# Patient Record
Sex: MALE | Race: WHITE | NOT HISPANIC OR LATINO
[De-identification: names, ages, dates, MRNs, and addresses within clinical notes are randomized per-mention and may not be internally consistent; named-entity substitution may affect disease eponyms.]

---

## 2017-12-04 PROBLEM — M25.551 RIGHT HIP PAIN: Status: ACTIVE | Noted: 2017-12-04

## 2017-12-05 ENCOUNTER — APPOINTMENT (OUTPATIENT)
Dept: ORTHOPEDIC SURGERY | Facility: CLINIC | Age: 57
End: 2017-12-05
Payer: COMMERCIAL

## 2017-12-05 VITALS
WEIGHT: 315 LBS | DIASTOLIC BLOOD PRESSURE: 90 MMHG | BODY MASS INDEX: 41.75 KG/M2 | HEART RATE: 106 BPM | SYSTOLIC BLOOD PRESSURE: 149 MMHG | HEIGHT: 73 IN

## 2017-12-05 DIAGNOSIS — Z86.39 PERSONAL HISTORY OF OTHER ENDOCRINE, NUTRITIONAL AND METABOLIC DISEASE: ICD-10-CM

## 2017-12-05 DIAGNOSIS — Z80.0 FAMILY HISTORY OF MALIGNANT NEOPLASM OF DIGESTIVE ORGANS: ICD-10-CM

## 2017-12-05 DIAGNOSIS — Z78.9 OTHER SPECIFIED HEALTH STATUS: ICD-10-CM

## 2017-12-05 DIAGNOSIS — M25.551 PAIN IN RIGHT HIP: ICD-10-CM

## 2017-12-05 DIAGNOSIS — Z82.61 FAMILY HISTORY OF ARTHRITIS: ICD-10-CM

## 2017-12-05 DIAGNOSIS — Z60.2 PROBLEMS RELATED TO LIVING ALONE: ICD-10-CM

## 2017-12-05 DIAGNOSIS — M16.11 UNILATERAL PRIMARY OSTEOARTHRITIS, RIGHT HIP: ICD-10-CM

## 2017-12-05 PROCEDURE — 73502 X-RAY EXAM HIP UNI 2-3 VIEWS: CPT | Mod: RT

## 2017-12-05 PROCEDURE — 99204 OFFICE O/P NEW MOD 45 MIN: CPT

## 2017-12-05 RX ORDER — FLUTICASONE PROPIONATE 50 UG/1
50 SPRAY, METERED NASAL
Qty: 16 | Refills: 0 | Status: ACTIVE | COMMUNITY
Start: 2017-11-24

## 2017-12-05 RX ORDER — GLIMEPIRIDE 4 MG/1
4 TABLET ORAL
Qty: 180 | Refills: 0 | Status: ACTIVE | COMMUNITY
Start: 2017-08-15

## 2017-12-05 RX ORDER — DIAZEPAM 5 MG/1
5 TABLET ORAL
Qty: 2 | Refills: 0 | Status: ACTIVE | COMMUNITY
Start: 2017-07-11

## 2017-12-05 RX ORDER — INSULIN DEGLUDEC INJECTION 200 U/ML
200 INJECTION, SOLUTION SUBCUTANEOUS
Qty: 45 | Refills: 0 | Status: ACTIVE | COMMUNITY
Start: 2017-01-09

## 2017-12-05 RX ORDER — CLARITHROMYCIN 500 MG/1
500 TABLET, FILM COATED ORAL
Qty: 20 | Refills: 0 | Status: ACTIVE | COMMUNITY
Start: 2017-11-24

## 2017-12-05 RX ORDER — RANITIDINE HYDROCHLORIDE 300 MG/1
300 CAPSULE ORAL
Qty: 90 | Refills: 0 | Status: ACTIVE | COMMUNITY
Start: 2017-01-27

## 2017-12-05 RX ORDER — VALSARTAN AND HYDROCHLOROTHIAZIDE 320; 25 MG/1; MG/1
320-25 TABLET, FILM COATED ORAL
Qty: 30 | Refills: 0 | Status: ACTIVE | COMMUNITY
Start: 2017-07-18

## 2017-12-05 RX ORDER — HYDROCHLOROTHIAZIDE 25 MG/1
25 TABLET ORAL
Qty: 30 | Refills: 0 | Status: ACTIVE | COMMUNITY
Start: 2017-06-05

## 2017-12-05 RX ORDER — ROSUVASTATIN CALCIUM 10 MG/1
10 TABLET, FILM COATED ORAL
Qty: 30 | Refills: 0 | Status: ACTIVE | COMMUNITY
Start: 2017-08-22

## 2017-12-05 RX ORDER — BLOOD SUGAR DIAGNOSTIC
STRIP MISCELLANEOUS
Qty: 200 | Refills: 0 | Status: ACTIVE | COMMUNITY
Start: 2017-01-26

## 2017-12-05 RX ORDER — TRAMADOL HYDROCHLORIDE AND ACETAMINOPHEN 37.5; 325 MG/1; MG/1
37.5-325 TABLET, FILM COATED ORAL
Qty: 60 | Refills: 0 | Status: ACTIVE | COMMUNITY
Start: 2017-08-17

## 2017-12-05 RX ORDER — MELOXICAM 15 MG/1
15 TABLET ORAL
Qty: 30 | Refills: 0 | Status: ACTIVE | COMMUNITY
Start: 2017-07-11

## 2017-12-05 RX ORDER — ROSUVASTATIN CALCIUM 5 MG/1
TABLET, FILM COATED ORAL
Refills: 0 | Status: ACTIVE | COMMUNITY

## 2017-12-05 RX ORDER — METFORMIN ER 500 MG 500 MG/1
500 TABLET ORAL
Qty: 360 | Refills: 0 | Status: ACTIVE | COMMUNITY
Start: 2017-08-15

## 2017-12-05 RX ORDER — PEN NEEDLE, DIABETIC 31 G X1/4"
31G X 6 MM NEEDLE, DISPOSABLE MISCELLANEOUS
Qty: 100 | Refills: 0 | Status: ACTIVE | COMMUNITY
Start: 2017-08-15

## 2017-12-05 RX ORDER — KETOCONAZOLE 20.5 MG/ML
2 SHAMPOO, SUSPENSION TOPICAL
Qty: 120 | Refills: 0 | Status: ACTIVE | COMMUNITY
Start: 2017-03-29

## 2017-12-05 SDOH — SOCIAL STABILITY - SOCIAL INSECURITY: PROBLEMS RELATED TO LIVING ALONE: Z60.2

## 2017-12-20 ENCOUNTER — OTHER (OUTPATIENT)
Age: 57
End: 2017-12-20

## 2017-12-26 ENCOUNTER — OTHER (OUTPATIENT)
Age: 57
End: 2017-12-26

## 2018-01-02 ENCOUNTER — APPOINTMENT (OUTPATIENT)
Dept: ORTHOPEDIC SURGERY | Facility: CLINIC | Age: 58
End: 2018-01-02

## 2018-01-16 ENCOUNTER — OUTPATIENT (OUTPATIENT)
Dept: OUTPATIENT SERVICES | Facility: HOSPITAL | Age: 58
LOS: 1 days | End: 2018-01-16
Payer: COMMERCIAL

## 2018-01-16 VITALS
HEIGHT: 73 IN | RESPIRATION RATE: 20 BRPM | WEIGHT: 315 LBS | HEART RATE: 85 BPM | DIASTOLIC BLOOD PRESSURE: 77 MMHG | SYSTOLIC BLOOD PRESSURE: 133 MMHG | TEMPERATURE: 98 F | OXYGEN SATURATION: 95 %

## 2018-01-16 DIAGNOSIS — E11.9 TYPE 2 DIABETES MELLITUS WITHOUT COMPLICATIONS: ICD-10-CM

## 2018-01-16 DIAGNOSIS — Z01.818 ENCOUNTER FOR OTHER PREPROCEDURAL EXAMINATION: ICD-10-CM

## 2018-01-16 DIAGNOSIS — Z90.89 ACQUIRED ABSENCE OF OTHER ORGANS: Chronic | ICD-10-CM

## 2018-01-16 DIAGNOSIS — M16.11 UNILATERAL PRIMARY OSTEOARTHRITIS, RIGHT HIP: ICD-10-CM

## 2018-01-16 DIAGNOSIS — Z98.890 OTHER SPECIFIED POSTPROCEDURAL STATES: Chronic | ICD-10-CM

## 2018-01-16 LAB
ALBUMIN SERPL ELPH-MCNC: 3.7 G/DL — SIGNIFICANT CHANGE UP (ref 3.3–5)
ALP SERPL-CCNC: 78 U/L — SIGNIFICANT CHANGE UP (ref 40–120)
ALT FLD-CCNC: 27 U/L — SIGNIFICANT CHANGE UP (ref 12–78)
ANION GAP SERPL CALC-SCNC: 9 MMOL/L — SIGNIFICANT CHANGE UP (ref 5–17)
APTT BLD: 30.5 SEC — SIGNIFICANT CHANGE UP (ref 27.5–37.4)
AST SERPL-CCNC: 16 U/L — SIGNIFICANT CHANGE UP (ref 15–37)
BASOPHILS # BLD AUTO: 0.1 K/UL — SIGNIFICANT CHANGE UP (ref 0–0.2)
BASOPHILS NFR BLD AUTO: 0.6 % — SIGNIFICANT CHANGE UP (ref 0–2)
BILIRUB SERPL-MCNC: 0.3 MG/DL — SIGNIFICANT CHANGE UP (ref 0.2–1.2)
BUN SERPL-MCNC: 16 MG/DL — SIGNIFICANT CHANGE UP (ref 7–23)
CALCIUM SERPL-MCNC: 9.2 MG/DL — SIGNIFICANT CHANGE UP (ref 8.5–10.1)
CHLORIDE SERPL-SCNC: 105 MMOL/L — SIGNIFICANT CHANGE UP (ref 96–108)
CO2 SERPL-SCNC: 28 MMOL/L — SIGNIFICANT CHANGE UP (ref 22–31)
CREAT SERPL-MCNC: 1.1 MG/DL — SIGNIFICANT CHANGE UP (ref 0.5–1.3)
EOSINOPHIL # BLD AUTO: 0.2 K/UL — SIGNIFICANT CHANGE UP (ref 0–0.5)
EOSINOPHIL NFR BLD AUTO: 1.9 % — SIGNIFICANT CHANGE UP (ref 0–6)
GLUCOSE SERPL-MCNC: 76 MG/DL — SIGNIFICANT CHANGE UP (ref 70–99)
HBA1C BLD-MCNC: 5.6 % — SIGNIFICANT CHANGE UP (ref 4–5.6)
HCT VFR BLD CALC: 41.3 % — SIGNIFICANT CHANGE UP (ref 39–50)
HGB BLD-MCNC: 13.4 G/DL — SIGNIFICANT CHANGE UP (ref 13–17)
INR BLD: 1 RATIO — SIGNIFICANT CHANGE UP (ref 0.88–1.16)
LYMPHOCYTES # BLD AUTO: 1.7 K/UL — SIGNIFICANT CHANGE UP (ref 1–3.3)
LYMPHOCYTES # BLD AUTO: 15.4 % — SIGNIFICANT CHANGE UP (ref 13–44)
MCHC RBC-ENTMCNC: 27.4 PG — SIGNIFICANT CHANGE UP (ref 27–34)
MCHC RBC-ENTMCNC: 32.3 GM/DL — SIGNIFICANT CHANGE UP (ref 32–36)
MCV RBC AUTO: 84.8 FL — SIGNIFICANT CHANGE UP (ref 80–100)
MONOCYTES # BLD AUTO: 0.9 K/UL — SIGNIFICANT CHANGE UP (ref 0–0.9)
MONOCYTES NFR BLD AUTO: 8.1 % — SIGNIFICANT CHANGE UP (ref 1–9)
MRSA PCR RESULT.: SIGNIFICANT CHANGE UP
NEUTROPHILS # BLD AUTO: 8.2 K/UL — HIGH (ref 1.8–7.4)
NEUTROPHILS NFR BLD AUTO: 74.1 % — SIGNIFICANT CHANGE UP (ref 43–77)
PLATELET # BLD AUTO: 360 K/UL — SIGNIFICANT CHANGE UP (ref 150–400)
POTASSIUM SERPL-MCNC: 4 MMOL/L — SIGNIFICANT CHANGE UP (ref 3.5–5.3)
POTASSIUM SERPL-SCNC: 4 MMOL/L — SIGNIFICANT CHANGE UP (ref 3.5–5.3)
PROT SERPL-MCNC: 8.1 G/DL — SIGNIFICANT CHANGE UP (ref 6–8.3)
PROTHROM AB SERPL-ACNC: 10.9 SEC — SIGNIFICANT CHANGE UP (ref 9.8–12.7)
RBC # BLD: 4.87 M/UL — SIGNIFICANT CHANGE UP (ref 4.2–5.8)
RBC # FLD: 14.3 % — SIGNIFICANT CHANGE UP (ref 10.3–14.5)
S AUREUS DNA NOSE QL NAA+PROBE: SIGNIFICANT CHANGE UP
SODIUM SERPL-SCNC: 142 MMOL/L — SIGNIFICANT CHANGE UP (ref 135–145)
WBC # BLD: 11.1 K/UL — HIGH (ref 3.8–10.5)
WBC # FLD AUTO: 11.1 K/UL — HIGH (ref 3.8–10.5)

## 2018-01-16 PROCEDURE — 93010 ELECTROCARDIOGRAM REPORT: CPT

## 2018-01-16 PROCEDURE — 71046 X-RAY EXAM CHEST 2 VIEWS: CPT | Mod: 26

## 2018-01-16 RX ORDER — DEXTROSE 50 % IN WATER 50 %
25 SYRINGE (ML) INTRAVENOUS ONCE
Qty: 0 | Refills: 0 | Status: DISCONTINUED | OUTPATIENT
Start: 2018-01-29 | End: 2018-01-30

## 2018-01-16 RX ORDER — DEXTROSE 50 % IN WATER 50 %
1 SYRINGE (ML) INTRAVENOUS ONCE
Qty: 0 | Refills: 0 | Status: DISCONTINUED | OUTPATIENT
Start: 2018-01-29 | End: 2018-01-30

## 2018-01-16 RX ORDER — DEXTROSE 50 % IN WATER 50 %
12.5 SYRINGE (ML) INTRAVENOUS ONCE
Qty: 0 | Refills: 0 | Status: DISCONTINUED | OUTPATIENT
Start: 2018-01-29 | End: 2018-01-30

## 2018-01-16 RX ORDER — GLUCAGON INJECTION, SOLUTION 0.5 MG/.1ML
1 INJECTION, SOLUTION SUBCUTANEOUS ONCE
Qty: 0 | Refills: 0 | Status: DISCONTINUED | OUTPATIENT
Start: 2018-01-29 | End: 2018-01-30

## 2018-01-16 RX ORDER — SODIUM CHLORIDE 9 MG/ML
1000 INJECTION, SOLUTION INTRAVENOUS
Qty: 0 | Refills: 0 | Status: DISCONTINUED | OUTPATIENT
Start: 2018-01-29 | End: 2018-01-30

## 2018-01-16 NOTE — H&P PST ADULT - MUSCULOSKELETAL
details… detailed exam decreased ROM due to pain/no joint warmth/no calf tenderness/normal/no joint swelling/no joint erythema/right hip

## 2018-01-16 NOTE — H&P PST ADULT - NEGATIVE ENMT SYMPTOMS
no hearing difficulty no nasal obstruction/no post-nasal discharge/no vertigo/no sinus symptoms/no tinnitus/no nasal congestion/no nasal discharge/no hearing difficulty/no ear pain

## 2018-01-16 NOTE — H&P PST ADULT - NEGATIVE GASTROINTESTINAL SYMPTOMS
no vomiting/no nausea/no diarrhea no change in bowel habits/no diarrhea/no vomiting/no constipation/no nausea/no flatulence/no abdominal pain

## 2018-01-16 NOTE — H&P PST ADULT - RS GEN PE MLT RESP DETAILS PC
no subcutaneous emphysema/breath sounds equal/airway patent/normal/no rales/no chest wall tenderness/no intercostal retractions/respirations non-labored/no rhonchi/no wheezes/good air movement/clear to auscultation bilaterally

## 2018-01-16 NOTE — H&P PST ADULT - NEGATIVE GENERAL GENITOURINARY SYMPTOMS
no flank pain R/no bladder infections/no flank pain L/no renal colic/no incontinence/no hematuria/normal libido

## 2018-01-16 NOTE — H&P PST ADULT - NEGATIVE MUSCULOSKELETAL SYMPTOMS
no joint swelling/no muscle weakness/no stiffness/no arm pain L/no arm pain R/no muscle cramps/no neck pain/no arthralgia

## 2018-01-16 NOTE — H&P PST ADULT - VISION (WITH CORRECTIVE LENSES IF THE PATIENT USUALLY WEARS THEM):
Normal vision: sees adequately in most situations; can see medication labels, newsprint Reading Glasses/Normal vision: sees adequately in most situations; can see medication labels, newsprint

## 2018-01-16 NOTE — H&P PST ADULT - ASSESSMENT
This is a 58 year old male with PMH of DM, HTN, HLD who presents today for pre-surgical testing for right total hip replacement on 01/29/17 with Dr Hopper.

## 2018-01-16 NOTE — H&P PST ADULT - HISTORY OF PRESENT ILLNESS
This is a 58 year old male with PMH of DM, HTN, HLD who presents today for pre-surgical testing for    on   . having right hip pain x 1-1.5 yrs ago, had multiple injection, had MRIs in Nov from where he was referred for hip replacement. This is a 58 year old male with PMH of DM, HTN, HLD who presents today for pre-surgical testing for right total hip replacement on 01/29/17 with Dr Hopper. Reports having right hip pain x 1-1.5 yrs ago, had multiple injection to the site with temporary relief , had MRIs in Nov and was diagnosed with osteoarthritis of right hip, from where he was referred for hip replacement. This is a 58 year old male with PMH of DM, HTN, HLD who presents today for pre-surgical testing for right total hip replacement on 01/29/17 with Dr Hopper. Reports having right hip pain x 1-1.5 yrs ago, and limited ROM, had multiple injection to the site with temporary relief , had MRIs in Nov and was diagnosed with osteoarthritis of right hip, from where he was referred for hip replacement.

## 2018-01-16 NOTE — H&P PST ADULT - PROBLEM SELECTOR PLAN 3
A1c pending. Last A1c 6.1 in november. Advised that he/she will need Endo consult if A1c came back 9 or above. Instructed patient to stop taking metformin the night before and day of surgery .Fingerstick am of surgery. Verbalized understanding.

## 2018-01-16 NOTE — H&P PST ADULT - FAMILY HISTORY
Father  Still living? No  FH: prostate cancer, Age at diagnosis: Age Unknown  Family history of colon cancer, Age at diagnosis: Age Unknown     Mother  Still living? No  Family history of angina, Age at diagnosis: Age Unknown  Family history of dementia, Age at diagnosis: Age Unknown

## 2018-01-16 NOTE — H&P PST ADULT - PROBLEM SELECTOR PLAN 2
Labs, EKG, CXR,  Medical Clearance with pmd AND cardiologist advised, Pre op and Hibiclens instructions reviewed and given. Take routine am meds on day of surgery with sips of water. Avoid NSAIDs and OTC supplements. Verbalized understanding  Celebrex as advised by DR Hopper

## 2018-01-16 NOTE — H&P PST ADULT - NEGATIVE OPHTHALMOLOGIC SYMPTOMS
no discharge L/no pain R/no photophobia/no blurred vision R/no diplopia/no blurred vision L/no lacrimation L/no lacrimation R/no discharge R/no pain L/no irritation L

## 2018-01-26 RX ORDER — VALSARTAN 80 MG/1
320 TABLET ORAL DAILY
Qty: 0 | Refills: 0 | Status: DISCONTINUED | OUTPATIENT
Start: 2018-01-29 | End: 2018-01-30

## 2018-01-26 RX ORDER — ATORVASTATIN CALCIUM 80 MG/1
40 TABLET, FILM COATED ORAL AT BEDTIME
Qty: 0 | Refills: 0 | Status: DISCONTINUED | OUTPATIENT
Start: 2018-01-29 | End: 2018-01-30

## 2018-01-26 RX ORDER — MORPHINE SULFATE 50 MG/1
4 CAPSULE, EXTENDED RELEASE ORAL
Qty: 0 | Refills: 0 | Status: DISCONTINUED | OUTPATIENT
Start: 2018-01-29 | End: 2018-01-30

## 2018-01-26 RX ORDER — INSULIN LISPRO 100/ML
VIAL (ML) SUBCUTANEOUS
Qty: 0 | Refills: 0 | Status: DISCONTINUED | OUTPATIENT
Start: 2018-01-29 | End: 2018-01-30

## 2018-01-26 RX ORDER — ACETAMINOPHEN 500 MG
650 TABLET ORAL EVERY 8 HOURS
Qty: 0 | Refills: 0 | Status: DISCONTINUED | OUTPATIENT
Start: 2018-01-30 | End: 2018-01-30

## 2018-01-26 RX ORDER — DOCUSATE SODIUM 100 MG
100 CAPSULE ORAL THREE TIMES A DAY
Qty: 0 | Refills: 0 | Status: DISCONTINUED | OUTPATIENT
Start: 2018-01-29 | End: 2018-01-30

## 2018-01-26 RX ORDER — FERROUS SULFATE 325(65) MG
325 TABLET ORAL
Qty: 0 | Refills: 0 | Status: DISCONTINUED | OUTPATIENT
Start: 2018-01-29 | End: 2018-01-30

## 2018-01-26 RX ORDER — CEFAZOLIN SODIUM 1 G
3000 VIAL (EA) INJECTION ONCE
Qty: 0 | Refills: 0 | Status: COMPLETED | OUTPATIENT
Start: 2018-01-29 | End: 2018-01-29

## 2018-01-26 RX ORDER — SODIUM CHLORIDE 9 MG/ML
1000 INJECTION, SOLUTION INTRAVENOUS
Qty: 0 | Refills: 0 | Status: DISCONTINUED | OUTPATIENT
Start: 2018-01-29 | End: 2018-01-30

## 2018-01-26 RX ORDER — HYDROMORPHONE HYDROCHLORIDE 2 MG/ML
4 INJECTION INTRAMUSCULAR; INTRAVENOUS; SUBCUTANEOUS
Qty: 0 | Refills: 0 | Status: DISCONTINUED | OUTPATIENT
Start: 2018-01-29 | End: 2018-01-30

## 2018-01-26 RX ORDER — ASPIRIN/CALCIUM CARB/MAGNESIUM 324 MG
325 TABLET ORAL
Qty: 0 | Refills: 0 | Status: DISCONTINUED | OUTPATIENT
Start: 2018-01-29 | End: 2018-01-30

## 2018-01-26 RX ORDER — HYDROMORPHONE HYDROCHLORIDE 2 MG/ML
2 INJECTION INTRAMUSCULAR; INTRAVENOUS; SUBCUTANEOUS
Qty: 0 | Refills: 0 | Status: DISCONTINUED | OUTPATIENT
Start: 2018-01-29 | End: 2018-01-30

## 2018-01-26 RX ORDER — ASCORBIC ACID 60 MG
500 TABLET,CHEWABLE ORAL
Qty: 0 | Refills: 0 | Status: DISCONTINUED | OUTPATIENT
Start: 2018-01-29 | End: 2018-01-30

## 2018-01-26 RX ORDER — SODIUM CHLORIDE 9 MG/ML
1000 INJECTION, SOLUTION INTRAVENOUS
Qty: 0 | Refills: 0 | Status: DISCONTINUED | OUTPATIENT
Start: 2018-01-29 | End: 2018-01-29

## 2018-01-26 RX ORDER — ONDANSETRON 8 MG/1
4 TABLET, FILM COATED ORAL EVERY 6 HOURS
Qty: 0 | Refills: 0 | Status: DISCONTINUED | OUTPATIENT
Start: 2018-01-29 | End: 2018-01-30

## 2018-01-26 RX ORDER — PANTOPRAZOLE SODIUM 20 MG/1
40 TABLET, DELAYED RELEASE ORAL DAILY
Qty: 0 | Refills: 0 | Status: DISCONTINUED | OUTPATIENT
Start: 2018-01-29 | End: 2018-01-30

## 2018-01-26 RX ORDER — CELECOXIB 200 MG/1
200 CAPSULE ORAL
Qty: 0 | Refills: 0 | Status: DISCONTINUED | OUTPATIENT
Start: 2018-01-29 | End: 2018-01-30

## 2018-01-26 RX ORDER — FOLIC ACID 0.8 MG
1 TABLET ORAL DAILY
Qty: 0 | Refills: 0 | Status: DISCONTINUED | OUTPATIENT
Start: 2018-01-29 | End: 2018-01-30

## 2018-01-27 PROCEDURE — 83036 HEMOGLOBIN GLYCOSYLATED A1C: CPT

## 2018-01-27 PROCEDURE — 87641 MR-STAPH DNA AMP PROBE: CPT

## 2018-01-27 PROCEDURE — 93005 ELECTROCARDIOGRAM TRACING: CPT

## 2018-01-27 PROCEDURE — 86900 BLOOD TYPING SEROLOGIC ABO: CPT

## 2018-01-27 PROCEDURE — 86920 COMPATIBILITY TEST SPIN: CPT

## 2018-01-27 PROCEDURE — 86850 RBC ANTIBODY SCREEN: CPT

## 2018-01-27 PROCEDURE — 80053 COMPREHEN METABOLIC PANEL: CPT

## 2018-01-27 PROCEDURE — 85610 PROTHROMBIN TIME: CPT

## 2018-01-27 PROCEDURE — 85730 THROMBOPLASTIN TIME PARTIAL: CPT

## 2018-01-27 PROCEDURE — G0463: CPT

## 2018-01-27 PROCEDURE — 87640 STAPH A DNA AMP PROBE: CPT

## 2018-01-27 PROCEDURE — 71046 X-RAY EXAM CHEST 2 VIEWS: CPT

## 2018-01-27 PROCEDURE — 85027 COMPLETE CBC AUTOMATED: CPT

## 2018-01-27 PROCEDURE — 86901 BLOOD TYPING SEROLOGIC RH(D): CPT

## 2018-01-29 ENCOUNTER — TRANSCRIPTION ENCOUNTER (OUTPATIENT)
Age: 58
End: 2018-01-29

## 2018-01-29 ENCOUNTER — INPATIENT (INPATIENT)
Facility: HOSPITAL | Age: 58
LOS: 0 days | Discharge: HOME | DRG: 470 | End: 2018-01-30
Attending: ORTHOPAEDIC SURGERY | Admitting: ORTHOPAEDIC SURGERY
Payer: COMMERCIAL

## 2018-01-29 ENCOUNTER — RESULT REVIEW (OUTPATIENT)
Age: 58
End: 2018-01-29

## 2018-01-29 VITALS
TEMPERATURE: 98 F | OXYGEN SATURATION: 98 % | HEART RATE: 91 BPM | RESPIRATION RATE: 16 BRPM | WEIGHT: 315 LBS | DIASTOLIC BLOOD PRESSURE: 68 MMHG | HEIGHT: 73 IN | SYSTOLIC BLOOD PRESSURE: 121 MMHG

## 2018-01-29 DIAGNOSIS — I10 ESSENTIAL (PRIMARY) HYPERTENSION: ICD-10-CM

## 2018-01-29 DIAGNOSIS — G47.39 OTHER SLEEP APNEA: ICD-10-CM

## 2018-01-29 DIAGNOSIS — Z98.890 OTHER SPECIFIED POSTPROCEDURAL STATES: Chronic | ICD-10-CM

## 2018-01-29 DIAGNOSIS — E78.5 HYPERLIPIDEMIA, UNSPECIFIED: ICD-10-CM

## 2018-01-29 DIAGNOSIS — M16.11 UNILATERAL PRIMARY OSTEOARTHRITIS, RIGHT HIP: ICD-10-CM

## 2018-01-29 DIAGNOSIS — Z90.89 ACQUIRED ABSENCE OF OTHER ORGANS: Chronic | ICD-10-CM

## 2018-01-29 LAB
HCT VFR BLD CALC: 34.5 % — LOW (ref 39–50)
HGB BLD-MCNC: 11 G/DL — LOW (ref 13–17)

## 2018-01-29 PROCEDURE — 88305 TISSUE EXAM BY PATHOLOGIST: CPT | Mod: 26

## 2018-01-29 PROCEDURE — 88311 DECALCIFY TISSUE: CPT | Mod: 26

## 2018-01-29 PROCEDURE — 73501 X-RAY EXAM HIP UNI 1 VIEW: CPT | Mod: 26,RT

## 2018-01-29 RX ORDER — SODIUM CHLORIDE 9 MG/ML
1000 INJECTION, SOLUTION INTRAVENOUS
Qty: 0 | Refills: 0 | Status: DISCONTINUED | OUTPATIENT
Start: 2018-01-29 | End: 2018-01-29

## 2018-01-29 RX ORDER — ACETAMINOPHEN 500 MG
1000 TABLET ORAL ONCE
Qty: 0 | Refills: 0 | Status: COMPLETED | OUTPATIENT
Start: 2018-01-29 | End: 2018-01-29

## 2018-01-29 RX ORDER — HYDROMORPHONE HYDROCHLORIDE 2 MG/ML
0.5 INJECTION INTRAMUSCULAR; INTRAVENOUS; SUBCUTANEOUS
Qty: 0 | Refills: 0 | Status: DISCONTINUED | OUTPATIENT
Start: 2018-01-29 | End: 2018-01-29

## 2018-01-29 RX ORDER — ONDANSETRON 8 MG/1
4 TABLET, FILM COATED ORAL ONCE
Qty: 0 | Refills: 0 | Status: DISCONTINUED | OUTPATIENT
Start: 2018-01-29 | End: 2018-01-29

## 2018-01-29 RX ORDER — CEFAZOLIN SODIUM 1 G
3000 VIAL (EA) INJECTION EVERY 8 HOURS
Qty: 0 | Refills: 0 | Status: COMPLETED | OUTPATIENT
Start: 2018-01-29 | End: 2018-01-30

## 2018-01-29 RX ORDER — METOCLOPRAMIDE HCL 10 MG
10 TABLET ORAL ONCE
Qty: 0 | Refills: 0 | Status: DISCONTINUED | OUTPATIENT
Start: 2018-01-29 | End: 2018-01-29

## 2018-01-29 RX ORDER — ACETAMINOPHEN 500 MG
1000 TABLET ORAL ONCE
Qty: 0 | Refills: 0 | Status: COMPLETED | OUTPATIENT
Start: 2018-01-30 | End: 2018-01-30

## 2018-01-29 RX ORDER — BUPIVACAINE 13.3 MG/ML
20 INJECTION, SUSPENSION, LIPOSOMAL INFILTRATION ONCE
Qty: 0 | Refills: 0 | Status: DISCONTINUED | OUTPATIENT
Start: 2018-01-29 | End: 2018-01-29

## 2018-01-29 RX ORDER — MEPERIDINE HYDROCHLORIDE 50 MG/ML
12.5 INJECTION INTRAMUSCULAR; INTRAVENOUS; SUBCUTANEOUS
Qty: 0 | Refills: 0 | Status: DISCONTINUED | OUTPATIENT
Start: 2018-01-29 | End: 2018-01-29

## 2018-01-29 RX ORDER — OXYCODONE AND ACETAMINOPHEN 5; 325 MG/1; MG/1
1 TABLET ORAL EVERY 4 HOURS
Qty: 0 | Refills: 0 | Status: DISCONTINUED | OUTPATIENT
Start: 2018-01-29 | End: 2018-01-29

## 2018-01-29 RX ADMIN — Medication 500 MILLIGRAM(S): at 17:45

## 2018-01-29 RX ADMIN — SODIUM CHLORIDE 100 MILLILITER(S): 9 INJECTION, SOLUTION INTRAVENOUS at 16:21

## 2018-01-29 RX ADMIN — Medication 100 MILLIGRAM(S): at 22:25

## 2018-01-29 RX ADMIN — Medication 400 MILLIGRAM(S): at 12:57

## 2018-01-29 RX ADMIN — Medication 1: at 16:49

## 2018-01-29 RX ADMIN — Medication 1000 MILLIGRAM(S): at 19:32

## 2018-01-29 RX ADMIN — Medication 325 MILLIGRAM(S): at 17:45

## 2018-01-29 RX ADMIN — Medication 1000 MILLIGRAM(S): at 13:13

## 2018-01-29 RX ADMIN — Medication 400 MILLIGRAM(S): at 18:09

## 2018-01-29 RX ADMIN — CELECOXIB 200 MILLIGRAM(S): 200 CAPSULE ORAL at 19:32

## 2018-01-29 RX ADMIN — ATORVASTATIN CALCIUM 40 MILLIGRAM(S): 80 TABLET, FILM COATED ORAL at 22:25

## 2018-01-29 RX ADMIN — SODIUM CHLORIDE 75 MILLILITER(S): 9 INJECTION, SOLUTION INTRAVENOUS at 07:05

## 2018-01-29 RX ADMIN — SODIUM CHLORIDE 100 MILLILITER(S): 9 INJECTION, SOLUTION INTRAVENOUS at 11:15

## 2018-01-29 RX ADMIN — Medication 200 MILLIGRAM(S): at 16:21

## 2018-01-29 RX ADMIN — CELECOXIB 200 MILLIGRAM(S): 200 CAPSULE ORAL at 17:45

## 2018-01-29 NOTE — PROGRESS NOTE ADULT - SUBJECTIVE AND OBJECTIVE BOX
Orthopedics Post-op Check    POD 0 Total Hip Arthroplasty  Pt Seen and Examined. Pain is controlled with only some discomfort. No nausea or vomiting.    Vital Signs Last 24 Hrs  T(C): 36.3 (01-29-18 @ 11:02), Max: 36.5 (01-29-18 @ 06:33)  T(F): 97.3 (01-29-18 @ 11:02), Max: 97.7 (01-29-18 @ 06:33)  HR: 102 (01-29-18 @ 11:32) (91 - 112)  BP: 16/60 (01-29-18 @ 11:32) (16/60 - 148/58)  BP(mean): --  RR: 16 (01-29-18 @ 11:32) (16 - 18)  SpO2: 98% (01-29-18 @ 11:32) (90% - 98%)                        11.0   x     )-----------( x        ( 29 Jan 2018 11:09 )             34.5       Exam:  NAD AAOx3  Dressing clean and dry  +EHL FHL TA GS  SILT L2-S1  +DP  Calf Soft NT    A/P:   58y Male s/p R Total Hip Arthroplasty  -Pain control  -DVT/PE ppx  - WBAT/PT/OOB  - FU Labs  - D/C Planning

## 2018-01-29 NOTE — ASU PATIENT PROFILE, ADULT - PMH
Hyperlipidemia    Hypertension    Low back pain, unspecified back pain laterality, unspecified chronicity, with sciatica presence unspecified    Other sleep apnea    Type 2 diabetes mellitus    Unilateral primary osteoarthritis, right hip

## 2018-01-29 NOTE — PHYSICAL THERAPY INITIAL EVALUATION ADULT - CRITERIA FOR SKILLED THERAPEUTIC INTERVENTIONS
therapy frequency/anticipated discharge recommendation/impairments found/rehab potential/functional limitations in following categories/risk reduction/prevention

## 2018-01-29 NOTE — CONSULT NOTE ADULT - PROBLEM SELECTOR RECOMMENDATION 9
pt's family will bring his Tresiba tomorrow (would consider lowering dose when initiated)  hold metformin and glimeperide

## 2018-01-29 NOTE — CONSULT NOTE ADULT - SUBJECTIVE AND OBJECTIVE BOX
Patient is a 58y old  Male who presents with a chief complaint of " Have bad arthritis of right hip, bone on bone" (29 Jan 2018 11:13)  pt feels very well presently, no complaints      INTERVAL HPI/OVERNIGHT EVENTS:  T(C): 36.5 (01-29-18 @ 16:03), Max: 36.6 (01-29-18 @ 13:45)  HR: 94 (01-29-18 @ 16:03) (91 - 112)  BP: 109/68 (01-29-18 @ 16:03) (103/51 - 148/58)  RR: 16 (01-29-18 @ 16:03) (15 - 18)  SpO2: 98% (01-29-18 @ 18:26) (80% - 98%)  Wt(kg): --  I&O's Summary    29 Jan 2018 07:01  -  29 Jan 2018 20:42  --------------------------------------------------------  IN: 2230 mL / OUT: 700 mL / NET: 1530 mL        PAST MEDICAL & SURGICAL HISTORY:  Low back pain, unspecified back pain laterality, unspecified chronicity, with sciatica presence unspecified  Unilateral primary osteoarthritis, right hip  Hyperlipidemia  Hypertension  Other sleep apnea  Type 2 diabetes mellitus  H/O cystoscopy  History of tonsillectomy      SOCIAL HISTORY  Alcohol: social  Tobacco:quit '07  Illicit substance use: none      FAMILY HISTORY:    Home Medications:  CeleBREX 100 mg oral capsule: 1 cap(s) orally 2 times a day (29 Jan 2018 06:23)  Co Q-10 100 mg oral capsule: 1 cap(s) orally once a day (29 Jan 2018 06:23)  glimepiride 4 mg oral tablet: 1 tab(s) orally 2 times a day (29 Jan 2018 06:23)  metFORMIN 500 mg oral tablet:  orally 2 in am and 2 at night (29 Jan 2018 06:23)  rosuvastatin 10 mg oral tablet: 1 tab(s) orally once a day (at bedtime) (29 Jan 2018 06:23)  Tresiba FlexTouch 100 units/mL subcutaneous solution: 30 units in am  30 units in pm (29 Jan 2018 06:23)  valsartan-hydrochlorothiazide 320mg-25mg oral tablet: 1 tab(s) orally once a day (29 Jan 2018 06:23)        LABS:                        11.0   x     )-----------( x        ( 29 Jan 2018 11:09 )             34.5               CAPILLARY BLOOD GLUCOSE      POCT Blood Glucose.: 163 mg/dL (29 Jan 2018 16:34)  POCT Blood Glucose.: 120 mg/dL (29 Jan 2018 11:05)  POCT Blood Glucose.: 100 mg/dL (29 Jan 2018 06:25)            MEDICATIONS  (STANDING):  ascorbic acid 500 milliGRAM(s) Oral two times a day  aspirin enteric coated 325 milliGRAM(s) Oral two times a day  atorvastatin 40 milliGRAM(s) Oral at bedtime  ceFAZolin   IVPB 3000 milliGRAM(s) IV Intermittent every 8 hours  celecoxib 200 milliGRAM(s) Oral two times a day after meals  dextrose 5%. 1000 milliLiter(s) (50 mL/Hr) IV Continuous <Continuous>  dextrose 50% Injectable 12.5 Gram(s) IV Push once  dextrose 50% Injectable 25 Gram(s) IV Push once  dextrose 50% Injectable 25 Gram(s) IV Push once  docusate sodium 100 milliGRAM(s) Oral three times a day  ferrous    sulfate 325 milliGRAM(s) Oral three times a day with meals  folic acid 1 milliGRAM(s) Oral daily  insulin lispro (HumaLOG) corrective regimen sliding scale   SubCutaneous three times a day before meals  lactated ringers. 1000 milliLiter(s) (100 mL/Hr) IV Continuous <Continuous>  multivitamin 1 Tablet(s) Oral daily  pantoprazole    Tablet 40 milliGRAM(s) Oral daily  valsartan 320 milliGRAM(s) Oral daily    MEDICATIONS  (PRN):  dextrose Gel 1 Dose(s) Oral once PRN Blood Glucose LESS THAN 70 milliGRAM(s)/deciLiter  glucagon  Injectable 1 milliGRAM(s) IntraMuscular once PRN Glucose <70 milliGRAM(s)/deciLiter  HYDROmorphone   Tablet 2 milliGRAM(s) Oral every 3 hours PRN Moderate Pain (4 - 6)  HYDROmorphone   Tablet 4 milliGRAM(s) Oral every 3 hours PRN Severe Pain (7 - 10)  morphine  - Injectable 4 milliGRAM(s) IV Push every 3 hours PRN Severe Pain (7 - 10)  ondansetron Injectable 4 milliGRAM(s) IV Push every 6 hours PRN Nausea and/or Vomiting      REVIEW OF SYSTEMS:  CONSTITUTIONAL: No fever, weight loss, or fatigue  EYES: No eye pain, visual disturbances, or discharge  ENMT:  No difficulty hearing, tinnitus, vertigo; No sinus or throat pain  NECK: No pain or stiffness  RESPIRATORY: No cough, wheezing, chills or hemoptysis; No shortness of breath  CARDIOVASCULAR: No chest pain, palpitations, dizziness, or leg swelling  GASTROINTESTINAL: No abdominal or epigastric pain. No nausea, vomiting, or hematemesis; No diarrhea or constipation. No melena or hematochezia.  GENITOURINARY: No dysuria, frequency, hematuria, or incontinence  NEUROLOGICAL: No headaches, memory loss, loss of strength, numbness, or tremors  SKIN: No itching, burning, rashes, or lesions   LYMPH NODES: No enlarged glands  ENDOCRINE: No heat or cold intolerance; No hair loss  MUSCULOSKELETAL: chronic hip pain  PSYCHIATRIC: No depression, anxiety, mood swings, or difficulty sleeping  HEME/LYMPH: No easy bruising, or bleeding gums  ALLERY AND IMMUNOLOGIC: No hives or eczema    RADIOLOGY & ADDITIONAL TESTS:    Imaging Personally Reviewed:  [y ] YES  [ ] NO    Consultant(s) Notes Reviewed:  [y ] YES  [ ] NO        PHYSICAL EXAM:  GENERAL: NAD, well-groomed, well-developed  HEAD:  Atraumatic, Normocephalic  EYES: EOMI, PERRLA, conjunctiva and sclera clear  ENMT: No tonsillar erythema, exudates, or enlargement; Moist mucous membranes, Good dentition, No lesions  NECK: Supple, No JVD, Normal thyroid  NERVOUS SYSTEM:  Alert & Oriented X3, Good concentration; Motor Strength 5/5 B/L upper and lower extremities; DTRs 2+ intact and symmetric  CHEST/LUNG: Clear to percussion bilaterally; No rales, rhonchi, wheezing, or rubs  HEART: Regular rate and rhythm; No murmurs, rubs, or gallops  ABDOMEN: Soft, Nontender, Nondistended; Bowel sounds present  EXTREMITIES:  2+ Peripheral Pulses, No clubbing, cyanosis, or edema  LYMPH: No lymphadenopathy noted  SKIN: No rashes or lesions    Care Discussed with Consultants/Other Providers [y ] YES  [ ] NO

## 2018-01-29 NOTE — BRIEF OPERATIVE NOTE - PROCEDURE
<<-----Click on this checkbox to enter Procedure EDUARDO (total hip arthroplasty)  01/30/2018    Active  [id number]

## 2018-01-30 VITALS
RESPIRATION RATE: 16 BRPM | OXYGEN SATURATION: 93 % | HEART RATE: 94 BPM | SYSTOLIC BLOOD PRESSURE: 115 MMHG | TEMPERATURE: 98 F | DIASTOLIC BLOOD PRESSURE: 70 MMHG

## 2018-01-30 LAB
ANION GAP SERPL CALC-SCNC: 7 MMOL/L — SIGNIFICANT CHANGE UP (ref 5–17)
BUN SERPL-MCNC: 13 MG/DL — SIGNIFICANT CHANGE UP (ref 7–23)
CALCIUM SERPL-MCNC: 8.6 MG/DL — SIGNIFICANT CHANGE UP (ref 8.5–10.1)
CHLORIDE SERPL-SCNC: 102 MMOL/L — SIGNIFICANT CHANGE UP (ref 96–108)
CO2 SERPL-SCNC: 30 MMOL/L — SIGNIFICANT CHANGE UP (ref 22–31)
CREAT SERPL-MCNC: 1 MG/DL — SIGNIFICANT CHANGE UP (ref 0.5–1.3)
GLUCOSE SERPL-MCNC: 128 MG/DL — HIGH (ref 70–99)
HCT VFR BLD CALC: 31.9 % — LOW (ref 39–50)
HGB BLD-MCNC: 10.2 G/DL — LOW (ref 13–17)
POTASSIUM SERPL-MCNC: 4 MMOL/L — SIGNIFICANT CHANGE UP (ref 3.5–5.3)
POTASSIUM SERPL-SCNC: 4 MMOL/L — SIGNIFICANT CHANGE UP (ref 3.5–5.3)
SODIUM SERPL-SCNC: 139 MMOL/L — SIGNIFICANT CHANGE UP (ref 135–145)

## 2018-01-30 PROCEDURE — C1776: CPT

## 2018-01-30 PROCEDURE — 88311 DECALCIFY TISSUE: CPT

## 2018-01-30 PROCEDURE — 97530 THERAPEUTIC ACTIVITIES: CPT

## 2018-01-30 PROCEDURE — 97116 GAIT TRAINING THERAPY: CPT

## 2018-01-30 PROCEDURE — C1713: CPT

## 2018-01-30 PROCEDURE — 97161 PT EVAL LOW COMPLEX 20 MIN: CPT

## 2018-01-30 PROCEDURE — 82962 GLUCOSE BLOOD TEST: CPT

## 2018-01-30 PROCEDURE — 80048 BASIC METABOLIC PNL TOTAL CA: CPT

## 2018-01-30 PROCEDURE — 73501 X-RAY EXAM HIP UNI 1 VIEW: CPT

## 2018-01-30 PROCEDURE — 88305 TISSUE EXAM BY PATHOLOGIST: CPT

## 2018-01-30 PROCEDURE — 85018 HEMOGLOBIN: CPT

## 2018-01-30 RX ORDER — ROSUVASTATIN CALCIUM 5 MG/1
1 TABLET ORAL
Qty: 0 | Refills: 0 | COMMUNITY

## 2018-01-30 RX ORDER — METFORMIN HYDROCHLORIDE 850 MG/1
1 TABLET ORAL
Qty: 0 | Refills: 0 | DISCHARGE
Start: 2018-01-30

## 2018-01-30 RX ORDER — INSULIN DEGLUDEC 100 U/ML
0 INJECTION, SOLUTION SUBCUTANEOUS
Qty: 0 | Refills: 0 | DISCHARGE
Start: 2018-01-30

## 2018-01-30 RX ORDER — PANTOPRAZOLE SODIUM 20 MG/1
1 TABLET, DELAYED RELEASE ORAL
Qty: 30 | Refills: 0 | OUTPATIENT
Start: 2018-01-30 | End: 2018-02-28

## 2018-01-30 RX ORDER — PANTOPRAZOLE SODIUM 20 MG/1
1 TABLET, DELAYED RELEASE ORAL
Qty: 30 | Refills: 0
Start: 2018-01-30 | End: 2018-02-28

## 2018-01-30 RX ORDER — METFORMIN HYDROCHLORIDE 850 MG/1
2 TABLET ORAL
Qty: 0 | Refills: 0 | DISCHARGE
Start: 2018-01-30

## 2018-01-30 RX ORDER — ROSUVASTATIN CALCIUM 5 MG/1
1 TABLET ORAL
Qty: 0 | Refills: 0 | DISCHARGE
Start: 2018-01-30

## 2018-01-30 RX ORDER — ASPIRIN/CALCIUM CARB/MAGNESIUM 324 MG
1 TABLET ORAL
Qty: 60 | Refills: 0
Start: 2018-01-30 | End: 2018-02-28

## 2018-01-30 RX ORDER — HYDROMORPHONE HYDROCHLORIDE 2 MG/ML
1 INJECTION INTRAMUSCULAR; INTRAVENOUS; SUBCUTANEOUS
Qty: 28 | Refills: 0
Start: 2018-01-30 | End: 2018-02-05

## 2018-01-30 RX ORDER — GLIMEPIRIDE 1 MG
1 TABLET ORAL
Qty: 0 | Refills: 0 | COMMUNITY

## 2018-01-30 RX ORDER — ASPIRIN/CALCIUM CARB/MAGNESIUM 324 MG
1 TABLET ORAL
Qty: 60 | Refills: 0 | OUTPATIENT
Start: 2018-01-30 | End: 2018-02-28

## 2018-01-30 RX ORDER — HYDROMORPHONE HYDROCHLORIDE 2 MG/ML
1 INJECTION INTRAMUSCULAR; INTRAVENOUS; SUBCUTANEOUS
Qty: 28 | Refills: 0 | OUTPATIENT
Start: 2018-01-30 | End: 2018-02-05

## 2018-01-30 RX ORDER — GLIMEPIRIDE 1 MG
1 TABLET ORAL
Qty: 0 | Refills: 0 | DISCHARGE
Start: 2018-01-30

## 2018-01-30 RX ORDER — INSULIN DEGLUDEC 100 U/ML
0 INJECTION, SOLUTION SUBCUTANEOUS
Qty: 0 | Refills: 0 | COMMUNITY

## 2018-01-30 RX ADMIN — CELECOXIB 200 MILLIGRAM(S): 200 CAPSULE ORAL at 08:47

## 2018-01-30 RX ADMIN — Medication 1000 MILLIGRAM(S): at 03:40

## 2018-01-30 RX ADMIN — Medication 325 MILLIGRAM(S): at 05:50

## 2018-01-30 RX ADMIN — CELECOXIB 200 MILLIGRAM(S): 200 CAPSULE ORAL at 11:16

## 2018-01-30 RX ADMIN — Medication 400 MILLIGRAM(S): at 02:47

## 2018-01-30 RX ADMIN — Medication 200 MILLIGRAM(S): at 00:31

## 2018-01-30 RX ADMIN — Medication 500 MILLIGRAM(S): at 05:50

## 2018-01-30 RX ADMIN — Medication 325 MILLIGRAM(S): at 08:47

## 2018-01-30 RX ADMIN — Medication 100 MILLIGRAM(S): at 05:50

## 2018-01-30 NOTE — DISCHARGE NOTE ADULT - CARE PLAN
Principal Discharge DX:	Unilateral primary osteoarthritis, right hip  Goal:	RECOVER FROM SURGERY, PHYSICAL THERAPY  Assessment and plan of treatment:	WEIGHT BEARING AS TOLERATED.  FOLLOW UP WITH DR. SUTTON NEXT THURSDAY 02/08/2018 CALL [phone] FOR AN APPOINTMENT.  KEEP HIP PREVENA WOUND VAC IN PLACE , KEEP THE AREA DRY AND CLEAN, IT WILL BE CHANGED OR REMOVED ON YOUR NEXT OFFICE VISIT. Principal Discharge DX:	Unilateral primary osteoarthritis, right hip  Goal:	RECOVER FROM SURGERY, PHYSICAL THERAPY  Assessment and plan of treatment:	WEIGHT BEARING AS TOLERATED.  FOLLOW UP WITH DR. SUTTON NEXT THURSDAY 02/08/2018 CALL [phone] FOR AN APPOINTMENT.  KEEP RIGHT HIP PREVENA WOUND VAC IN PLACE , KEEP THE AREA DRY AND CLEAN, IT WILL BE CHANGED OR REMOVED ON YOUR NEXT OFFICE VISIT.

## 2018-01-30 NOTE — DISCHARGE NOTE ADULT - HOSPITAL COURSE
THIS IS A CASE OF A 57 YO MALE EVALUATED IN THE OFFICE DUE TO RIGHT  HIP PAIN.    PAST MEDICAL HISTORY: HTN, HYPERLIPIDEMIA, OA, EMILEE, TYPE 2 DIABETES     HOSPITAL COURSE: AFTER THE RISK AND BENEFITS OF SURGICAL INTERVENTION IN DETAILS WERE DISCUSSED WITH THE PATIENT, A CONSENT WAS OBTAINED. AFTER OBTAINING MEDICAL CLEARANCE AND PREOPERATIVE EVALUATION, THE PATIENT WAS TAKEN TO THE OPERATING ROOM ON 01/29/2018 AND THE PATIENT UNDERWENT A  RIGHT TOTAL HIP REPLACEMENT. POSTOPERATIVE PHASE, THE PATIENT WAS ANTICOAGULATED WITH ASPIRIN  AND WAS GIVEN 24 HOURS OF IV ANTIBIOTICS. A SOCIAL SERVICE CONSULT WAS OBTAINED FOR DISCHARGE PLANNING.  A PHYSICAL THERAPY CONSULT WAS OBTAINED FOR WEIGHT BEARING AS TOLERATED, HIP PRECAUTIONS.  DUE TO ANEMIA OF ACUTE BLOOD LOSS POST OP IRON SUPPLEMENT GIVEN.    DISPOSITION : HOME WITH HOME CARE ,  FOLLOW UP WITH DR. SUTTON AS OUTPATIENT. THIS IS A CASE OF A 57 YO MALE EVALUATED IN THE OFFICE DUE TO RIGHT  HIP PAIN.    PAST MEDICAL HISTORY: HTN, HYPERLIPIDEMIA, OA, EMILEE, TYPE 2 DIABETES     HOSPITAL COURSE: AFTER THE RISK AND BENEFITS OF SURGICAL INTERVENTION IN DETAILS WERE DISCUSSED WITH THE PATIENT, A CONSENT WAS OBTAINED. AFTER OBTAINING MEDICAL CLEARANCE AND PREOPERATIVE EVALUATION, THE PATIENT WAS TAKEN TO THE OPERATING ROOM ON 01/29/2018 AND THE PATIENT UNDERWENT A  RIGHT TOTAL HIP REPLACEMENT. POSTOPERATIVE PHASE, THE PATIENT WAS ANTICOAGULATED WITH ASPIRIN  AND WAS GIVEN 24 HOURS OF IV ANTIBIOTICS. A SOCIAL SERVICE CONSULT WAS OBTAINED FOR DISCHARGE PLANNING.  A PHYSICAL THERAPY CONSULT WAS OBTAINED FOR WEIGHT BEARING AS TOLERATED, HIP PRECAUTIONS.  DUE TO ANEMIA OF ACUTE BLOOD LOSS POST OP IRON SUPPLEMENT GIVEN.    DISPOSITION : HOME WITH HOME CARE WITH PREVENA WOUND VAC IN PLACE,  FOLLOW UP WITH DR. SUTTON AS OUTPATIENT.

## 2018-01-30 NOTE — DISCHARGE NOTE ADULT - NSTOBACCOHOTLINE_GEN_A_CS
[facility] Smokers Quitline ([phone]) [facility] Smokers Quitline ([phone])

## 2018-01-30 NOTE — DISCHARGE NOTE ADULT - PATIENT PORTAL LINK FT
“You can access the [employer] Patient Portal, offered by [facility], by registering with the following website: [email or web address]”

## 2018-01-30 NOTE — DISCHARGE NOTE ADULT - PLAN OF CARE
RECOVER FROM SURGERY, PHYSICAL THERAPY WEIGHT BEARING AS TOLERATED.  FOLLOW UP WITH DR. SUTTON NEXT THURSDAY 02/08/2018 CALL [phone] FOR AN APPOINTMENT.  KEEP HIP PREVENA WOUND VAC IN PLACE , KEEP THE AREA DRY AND CLEAN, IT WILL BE CHANGED OR REMOVED ON YOUR NEXT OFFICE VISIT. WEIGHT BEARING AS TOLERATED.  FOLLOW UP WITH DR. SUTTON NEXT THURSDAY 02/08/2018 CALL [phone] FOR AN APPOINTMENT.  KEEP RIGHT HIP PREVENA WOUND VAC IN PLACE , KEEP THE AREA DRY AND CLEAN, IT WILL BE CHANGED OR REMOVED ON YOUR NEXT OFFICE VISIT.

## 2018-01-30 NOTE — DISCHARGE NOTE ADULT - MEDICATION SUMMARY - MEDICATIONS TO TAKE
I will START or STAY ON the medications listed below when I get home from the hospital:    HYDROmorphone 2 mg oral tablet  -- 1 tab(s) by mouth every 6 hours MDD:4  -- Indication: For PAIN    Aspirin Enteric Coated 325 mg oral delayed release tablet  -- 1 tab(s) by mouth 2 times a day   -- Indication: For DVT PROPHYLAXIS FOR 30 DAYS     CeleBREX 100 mg oral capsule  -- 1 cap(s) by mouth 2 times a day  -- Indication: For PAIN    metFORMIN 500 mg oral tablet  -- 1 tab(s) by mouth 2 times a day  -- Indication: For HOME MEDICATION     glimepiride 4 mg oral tablet  -- 1 tab(s) by mouth 2 times a day  -- Indication: For HOME MEDICATION     insulin degludec 100 units/mL subcutaneous solution  -- TRESIBA FLEX TOUCH 110 UNITS/ ML SUBCUTANEOUC SOLUTUINS - 30 CC BY MOUTH  FOR CONSTIPATION UNITS IN AM   -- Indication: For HOME MEDICATION     rosuvastatin 10 mg oral tablet  -- 1 tab(s) by mouth once a day (at bedtime)  -- Indication: For HOME MEDICATION     valsartan-hydrochlorothiazide 320mg-25mg oral tablet  -- 1 tab(s) by mouth once a day  -- Indication: For HOME MEDICATION     pantoprazole 40 mg oral delayed release tablet  -- 1 tab(s) by mouth once a day  -- Indication: For PrEVENT STRESS ULCER I will START or STAY ON the medications listed below when I get home from the hospital:    Aspirin Enteric Coated 325 mg oral delayed release tablet  -- 1 tab(s) by mouth 2 times a day for dvt prophylaxis  -- Indication: For DVT PROPHYLAXIS    HYDROmorphone 2 mg oral tablet  -- 1 tab(s) by mouth every 6 hours MDD:4   -- Indication: For PAIN    CeleBREX 100 mg oral capsule  -- 1 cap(s) by mouth 2 times a day  -- Indication: For PAIN    metFORMIN 500 mg oral tablet  -- 1 tab(s) by mouth 2 times a day  -- Indication: For HOME MEDICATION     glimepiride 4 mg oral tablet  -- 1 tab(s) by mouth 2 times a day  -- Indication: For HOME MEDICATION     insulin degludec 100 units/mL subcutaneous solution  -- TRESIBA FLEX TOUCH 110 UNITS/ ML SUBCUTANEOUC SOLUTUINS - 30 CC BY MOUTH  FOR CONSTIPATION UNITS IN AM   -- Indication: For HOME MEDICATION     rosuvastatin 10 mg oral tablet  -- 1 tab(s) by mouth once a day (at bedtime)  -- Indication: For HOME MEDICATION     valsartan-hydrochlorothiazide 320mg-25mg oral tablet  -- 1 tab(s) by mouth once a day  -- Indication: For HOME MEDICATION     pantoprazole 40 mg oral delayed release tablet  -- 1 tab(s) by mouth once a day   -- Indication: For GI PROPHYLAXIS

## 2018-01-30 NOTE — DISCHARGE NOTE ADULT - VISION (WITH CORRECTIVE LENSES IF THE PATIENT USUALLY WEARS THEM):
Normal vision: sees adequately in most situations; can see medication labels, newsprint/Reading Glasses

## 2018-01-30 NOTE — PROGRESS NOTE ADULT - SUBJECTIVE AND OBJECTIVE BOX
The patient was interviewed and evaluated  58y Male s/p right THR with GA    T(C): 36.8 (01-30-18 @ 07:37), Max: 37.2 (01-30-18 @ 04:20)  HR: 94 (01-30-18 @ 07:37) (90 - 112)  BP: 115/70 (01-30-18 @ 07:37) (95/56 - 148/58)  RR: 16 (01-30-18 @ 07:37) (15 - 18)  SpO2: 93% (01-30-18 @ 07:37) (80% - 98%)  Wt(kg): --    Pt seen, doing well, no anesthesia complications or complaints noted or reported.   No Nausea. Mouth is a litlle dry secondary to ETT    No additional recommendations.     Pain well controlled

## 2018-01-30 NOTE — PROGRESS NOTE ADULT - SUBJECTIVE AND OBJECTIVE BOX
Patient is a 58y old  Male who presents with a chief complaint of RIGHT HIP PAIN, END STAGE OSTEOARTHRITIS  POST RIGHT THR (30 Jan 2018 05:56)      INTERVAL HPI/OVERNIGHT EVENTS:pt doing well, vitals stable, seen by pt, going home with home care today    MEDICATIONS  (STANDING):  acetaminophen   Tablet 650 milliGRAM(s) Oral every 8 hours  ascorbic acid 500 milliGRAM(s) Oral two times a day  aspirin enteric coated 325 milliGRAM(s) Oral two times a day  atorvastatin 40 milliGRAM(s) Oral at bedtime  celecoxib 200 milliGRAM(s) Oral two times a day after meals  dextrose 5%. 1000 milliLiter(s) (50 mL/Hr) IV Continuous <Continuous>  dextrose 50% Injectable 12.5 Gram(s) IV Push once  dextrose 50% Injectable 25 Gram(s) IV Push once  dextrose 50% Injectable 25 Gram(s) IV Push once  docusate sodium 100 milliGRAM(s) Oral three times a day  ferrous    sulfate 325 milliGRAM(s) Oral three times a day with meals  folic acid 1 milliGRAM(s) Oral daily  insulin lispro (HumaLOG) corrective regimen sliding scale   SubCutaneous three times a day before meals  multivitamin 1 Tablet(s) Oral daily  pantoprazole    Tablet 40 milliGRAM(s) Oral daily  valsartan 320 milliGRAM(s) Oral daily    MEDICATIONS  (PRN):  dextrose Gel 1 Dose(s) Oral once PRN Blood Glucose LESS THAN 70 milliGRAM(s)/deciLiter  glucagon  Injectable 1 milliGRAM(s) IntraMuscular once PRN Glucose <70 milliGRAM(s)/deciLiter  HYDROmorphone   Tablet 2 milliGRAM(s) Oral every 3 hours PRN Moderate Pain (4 - 6)  HYDROmorphone   Tablet 4 milliGRAM(s) Oral every 3 hours PRN Severe Pain (7 - 10)  morphine  - Injectable 4 milliGRAM(s) IV Push every 3 hours PRN Severe Pain (7 - 10)  ondansetron Injectable 4 milliGRAM(s) IV Push every 6 hours PRN Nausea and/or Vomiting      Allergies    No Known Allergies    Intolerances        REVIEW OF SYSTEMS:  CONSTITUTIONAL: No fever, weight loss, or fatigue  EYES: No eye pain, visual disturbances  ENMT:  No difficulty hearing, tinnitus, vertigo; No sinus or throat pain  NECK: No pain or stiffness  RESPIRATORY: No cough, wheezing, chills or hemoptysis; No shortness of breath  CARDIOVASCULAR: No chest pain, palpitations, dizziness  GASTROINTESTINAL: No abdominal or epigastric pain. No nausea, vomiting, or hematemesis; No diarrhea or constipation. No melena or hematochezia.  GENITOURINARY: No dysuria, frequency, hematuria, or incontinence  NEUROLOGICAL: No headaches, memory loss, loss of strength, numbness, or tremors  SKIN: No itching, burning  LYMPH NODES: No enlarged glands  MUSCULOSKELETAL: hip tenderness  PSYCHIATRIC: No depression, mood swings  HEME/LYMPH: No easy bruising, or bleeding gums  ALLERGY AND IMMUNOLOGIC: No hives    Vital Signs Last 24 Hrs  T(C): 36.8 (30 Jan 2018 07:37), Max: 37.2 (30 Jan 2018 04:20)  T(F): 98.3 (30 Jan 2018 07:37), Max: 98.9 (30 Jan 2018 04:20)  HR: 94 (30 Jan 2018 07:37) (90 - 103)  BP: 115/70 (30 Jan 2018 07:37) (95/56 - 125/55)  BP(mean): --  RR: 16 (30 Jan 2018 07:37) (15 - 18)  SpO2: 93% (30 Jan 2018 07:37) (80% - 98%)    PHYSICAL EXAM:  GENERAL: NAD, well-groomed, well-developed  HEAD:  Atraumatic, Normocephalic  EYES: EOMI, PERRLA, conjunctiva and sclera clear  ENMT: No tonsillar erythema, exudates, or enlargement   NECK: Supple, No JVD  NERVOUS SYSTEM:  Alert & Oriented X3, Good concentration  CHEST/LUNG: Clear to auscultation bilaterally; No rales, rhonchi, wheezing  HEART: Regular rate and rhythm  ABDOMEN: Soft, Nontender, Nondistended; Bowel sounds present  EXTREMITIES:  2+ Peripheral Pulses, no erythema   LYMPH: No lymphadenopathy noted  SKIN: No rashes     LABS:                        10.2   x     )-----------( x        ( 30 Jan 2018 04:42 )             31.9     30 Jan 2018 04:42    139    |  102    |  13     ----------------------------<  128    4.0     |  30     |  1.00     Ca    8.6        30 Jan 2018 04:42          CAPILLARY BLOOD GLUCOSE      POCT Blood Glucose.: 140 mg/dL (30 Jan 2018 07:50)  POCT Blood Glucose.: 132 mg/dL (29 Jan 2018 20:44)  POCT Blood Glucose.: 163 mg/dL (29 Jan 2018 16:34)            RADIOLOGY & ADDITIONAL TESTS:  < from: Xray Hip 1 View, Right (01.29.18 @ 10:07) >  XAM:  XR HIP 1V RT                            PROCEDURE DATE:  01/29/2018          INTERPRETATION:  Clinical information: Status post right total hip   replacement.    AP portable views of the right hip.    Patient is status post a noncemented right total hip arthroplasty.  The component alignment appears within normal limits on this single view.    Impression:    Findings as discussed above.                MICAELA BARRIOS M.D., ATTENDING RADIOLOGIST  This document has been electronically signed. Jan 29 2018 10:56AM                < end of copied text >        Consultant(s) Notes Reviewed:  [ x] YES  [ ] NO    Care Discussed with Consultants/Other Providers [x ] YES  [ ] NO    Advanced care planning discussed with patient and family, advanced care planning forms reviewed, discussed, and completed.  20 minutes spent.

## 2018-01-30 NOTE — DISCHARGE NOTE ADULT - CARE PROVIDER_API CALL
Isai Hopper), Orthopaedic Surgery  [address] [place] [address]  Phone: [phone]  Fax: [phone]

## 2018-01-30 NOTE — CONSULT NOTE ADULT - PROBLEM SELECTOR RECOMMENDATION 9
post op care  skin and wound care  pain control  caution with opioids in pt with EMILEE  EMILEE - NIPPV nightly  keep sat > 88 pct  I Carlitos  PT  out of bed with assist  am labs pending  will follow

## 2018-01-30 NOTE — PROGRESS NOTE ADULT - SUBJECTIVE AND OBJECTIVE BOX
DARLENE CASTRO      58y   male  [id number]    ORTHOPEDIC SURGERY / DR. SUTTON    POD # 1    Vital Signs Last 24 Hrs  T(C): 37.2 (30 Jan 2018 04:20), Max: 37.2 (30 Jan 2018 04:20)  T(F): 98.9 (30 Jan 2018 04:20), Max: 98.9 (30 Jan 2018 04:20)  HR: 93 (30 Jan 2018 04:20) (90 - 112)  BP: 101/62 (30 Jan 2018 04:20) (95/56 - 148/58)  BP(mean): --  RR: 17 (30 Jan 2018 04:20) (15 - 18)  SpO2: 94% (30 Jan 2018 04:20) (80% - 98%)    RIGHT HIP :    PREVENA WOUND VAC IN PLACE WITH GOOD SEAL   GOOD MOTOR TO RIGHT LOWER EXTREMITY  NEURO-VASCULAR STATUS INTACT  NO CALF TENDERNESS    Hemoglobin: 10.2 (01-30 @ 04:42)  Hemoglobin: 11.0 (01-29 @ 11:09)    Hematocrit: 31.9 (01-30 @ 04:42)  Hematocrit: 34.5 (01-29 @ 11:09)    01-30    139  |  102  |  13  ----------------------------<  128<H>  4.0   |  30  |  1.00    Ca    8.6      30 Jan 2018 04:42                              ASSESSMENT &  PLAN:  POD # 1 S/P RIGHT TOTAL HIP REPLACEMENT    WEIGHT  BEARING AS TOLERATED, OOB AND AMBULATE, PHYSICAL THERAPY   DVT PROPHYLAXIS  MG PO BID  INCENTIVE SPIROMETRY   DISCHARGE PLANNING TO HOME WITH HOME CARE TODAY

## 2018-01-30 NOTE — CONSULT NOTE ADULT - SUBJECTIVE AND OBJECTIVE BOX
Date/Time Patient Seen:  		  Referring MD:   Data Reviewed	       Patient is a 58y old  Male who presents with a chief complaint of " Have bad arthritis of right hip, bone on bone" (29 Jan 2018 11:13)      Subjective/HPI  in bed  seen and examined  on CPAP overnight for EMILEE  pt is post op    58y old  Male who presents with a chief complaint of " Have bad arthritis of right hip, bone on bone" (29 Jan 2018 11:13)  post op    58 year old male with PMH of DM, HTN, HLD who presents today for pre-surgical testing for right total hip replacement on 01/29/17 with Dr Hopper. Reports having right hip pain x 1-1.5 yrs ago, and limited ROM, had multiple injection to the site with temporary relief , had MRIs in Nov and was diagnosed with osteoarthritis of right hip, from where he was referred for hip replacement.     PAST MEDICAL & SURGICAL HISTORY:  Low back pain, unspecified back pain laterality, unspecified chronicity, with sciatica presence unspecified  Unilateral primary osteoarthritis, right hip  Hyperlipidemia  Hypertension  Other sleep apnea  Type 2 diabetes mellitus  H/O cystoscopy  History of tonsillectomy        Medication list         MEDICATIONS  (STANDING):  acetaminophen   Tablet 650 milliGRAM(s) Oral every 8 hours  ascorbic acid 500 milliGRAM(s) Oral two times a day  aspirin enteric coated 325 milliGRAM(s) Oral two times a day  atorvastatin 40 milliGRAM(s) Oral at bedtime  celecoxib 200 milliGRAM(s) Oral two times a day after meals  dextrose 5%. 1000 milliLiter(s) (50 mL/Hr) IV Continuous <Continuous>  dextrose 50% Injectable 12.5 Gram(s) IV Push once  dextrose 50% Injectable 25 Gram(s) IV Push once  dextrose 50% Injectable 25 Gram(s) IV Push once  docusate sodium 100 milliGRAM(s) Oral three times a day  ferrous    sulfate 325 milliGRAM(s) Oral three times a day with meals  folic acid 1 milliGRAM(s) Oral daily  insulin lispro (HumaLOG) corrective regimen sliding scale   SubCutaneous three times a day before meals  multivitamin 1 Tablet(s) Oral daily  pantoprazole    Tablet 40 milliGRAM(s) Oral daily  valsartan 320 milliGRAM(s) Oral daily    MEDICATIONS  (PRN):  dextrose Gel 1 Dose(s) Oral once PRN Blood Glucose LESS THAN 70 milliGRAM(s)/deciLiter  glucagon  Injectable 1 milliGRAM(s) IntraMuscular once PRN Glucose <70 milliGRAM(s)/deciLiter  HYDROmorphone   Tablet 2 milliGRAM(s) Oral every 3 hours PRN Moderate Pain (4 - 6)  HYDROmorphone   Tablet 4 milliGRAM(s) Oral every 3 hours PRN Severe Pain (7 - 10)  morphine  - Injectable 4 milliGRAM(s) IV Push every 3 hours PRN Severe Pain (7 - 10)  ondansetron Injectable 4 milliGRAM(s) IV Push every 6 hours PRN Nausea and/or Vomiting         Vitals log        ICU Vital Signs Last 24 Hrs  T(C): 37.2 (30 Jan 2018 04:20), Max: 37.2 (30 Jan 2018 04:20)  T(F): 98.9 (30 Jan 2018 04:20), Max: 98.9 (30 Jan 2018 04:20)  HR: 93 (30 Jan 2018 04:20) (90 - 112)  BP: 101/62 (30 Jan 2018 04:20) (95/56 - 148/58)  BP(mean): --  ABP: --  ABP(mean): --  RR: 17 (30 Jan 2018 04:20) (15 - 18)  SpO2: 94% (30 Jan 2018 04:20) (80% - 98%)           Input and Output:  I&O's Detail    29 Jan 2018 07:01  -  30 Jan 2018 06:14  --------------------------------------------------------  IN:    lactated ringers.: 400 mL    lactated ringers.: 500 mL    Oral Fluid: 1030 mL    Solution: 200 mL    Solution: 100 mL  Total IN: 2230 mL    OUT:    Voided: 1725 mL  Total OUT: 1725 mL    Total NET: 505 mL          Lab Data                        10.2   x     )-----------( x        ( 30 Jan 2018 04:42 )             31.9     01-30    139  |  102  |  13  ----------------------------<  128<H>  4.0   |  30  |  1.00    Ca    8.6      30 Jan 2018 04:42              Review of Systems	      Objective     Physical Examination    obese  head at  heart s1s2  lungs dec BS  abd soft  cn grossly int      Pertinent Lab findings & Imaging      Kaylee:  NO   Adequate UO     I&O's Detail    29 Jan 2018 07:01  -  30 Jan 2018 06:14  --------------------------------------------------------  IN:    lactated ringers.: 400 mL    lactated ringers.: 500 mL    Oral Fluid: 1030 mL    Solution: 200 mL    Solution: 100 mL  Total IN: 2230 mL    OUT:    Voided: 1725 mL  Total OUT: 1725 mL    Total NET: 505 mL               Discussed with:     Cultures:	        Radiology

## 2018-01-31 LAB — SURGICAL PATHOLOGY FINAL REPORT - CH: SIGNIFICANT CHANGE UP

## 2018-07-27 PROBLEM — Z80.0 FAMILY HISTORY OF COLON CANCER: Status: ACTIVE | Noted: 2017-12-05

## 2019-02-05 NOTE — PHYSICAL THERAPY INITIAL EVALUATION ADULT - PHYSICAL ASSIST/NONPHYSICAL ASSIST: SIT/STAND, REHAB EVAL
Renetta Minor Patient Age: 52 year old  MESSAGE:   Patient cancelled/no showed appointment on 02-05-19 at 12:20 due to patient can not leave work now.      This appointment was: . CANCELLED  Message routed as Routine priority   Message routed to the Provider and the PSR Pool for the site Provider is working at today    Close message on routing unless Provider input is needed         Next and Last Visit with Provider and Department  Last visit with this provider: 12/19/2018  Last visit with this department: 12/19/2018    Next visit with this provider: 5/15/2019  Next visit with this department: 5/15/2019    WEIGHT AND HEIGHT:   Wt Readings from Last 1 Encounters:   01/26/19 86.2 kg (190 lb)     Ht Readings from Last 1 Encounters:   01/26/19 5' 6\" (1.676 m)     BMI Readings from Last 1 Encounters:   01/26/19 30.67 kg/m²       ALLERGIES:  Ciprofloxacin; Sulfa antibiotics; Metoclopramide hcl; Nitrofurantoin; Opioid analgesics; and Penicillins  Current Outpatient Medications   Medication   • predniSONE (DELTASONE) 20 MG tablet   • ATORVASTATIN CALCIUM PO   • aspirin 81 MG tablet   • lisinopril (ZESTRIL) 5 MG tablet   • Coenzyme Q10 (CO Q-10) 300 MG Cap   • albuterol 108 (90 Base) MCG/ACT inhaler     No current facility-administered medications for this visit.      PHARMACY to use:           Pharmacy preference(s) on file:   [facility] [address] [place], [place] - [address] AT [facility] [address]  [facility] [address]  Phone: [phone] Fax: [phone]      CALL BACK INFO: Ok to leave response (including medical information) on answering machine  ROUTING: Patient's physician/staff        PCP: Maricarmen Angulo MD         INS: Payor: [employer] / Plan: [employer] / Product Type: [place] Risk   PATIENT ADDRESS:  [address] [place] [id number]      
1 person + 1 person to manage equipment

## 2019-10-02 PROBLEM — Z60.2 PERSON LIVING ALONE: Status: ACTIVE | Noted: 2017-12-05

## 2021-01-01 NOTE — BRIEF OPERATIVE NOTE - ESTIMATED BLOOD LOSS
ADMISSION HISTORY & PHYSICAL EXAM    Attendance at delivery: Reason for attendance at delivery: Caesarian Section and Fetal Distress prior to Delivery, delivery attendance requested by: Dr. Diaz    Mom is:  Information for the patient's mother:  Leslie Avalos \"Gissell\" [[id number]]   27 year old      Information for the patient's mother:  Leslie Avalos \"Gissell\" [[id number]]       Gestational Age: 41w3d at delivery    Maternal history includes:    Information for the patient's mother:  Leslie Avalos \"Gissell\" [[id number]]     Past Medical History:   Diagnosis Date   • ADHD (attention deficit hyperactivity disorder), combined type    • Anxiety    • Vitiligo       Information for the patient's mother:  Leslie Avalos AKIN \"Gissell\" [[id number]]     Social History     Tobacco Use   • Smoking status: Never Smoker   • Smokeless tobacco: Never Used   Substance Use Topics   • Alcohol use: Yes     Comment: 2   • Drug use: No      Maternal Social History  Information for the patient's mother:  Leslie Avalos \"Gissell\" [[id number]]     Social History     Tobacco Use   • Smoking status: Never Smoker   • Smokeless tobacco: Never Used   Substance Use Topics   • Alcohol use: Yes     Comment: 2   • Drug use: No      Information for the patient's mother:  Leslie Avalos \"Gissell\" [[id number]]     Sexually Active: Yes             Partners with: Male    Information for the patient's mother:  Leslie Avalos AKIN \"Gissell\" [[id number]]     Drug Use:    No              Information for the patient's mother:  Chio Avalosmiguel GERARDO \"Gissell\" [[id number]]   Review of patient's social economics indicates:  No social economics status on file     Information for the patient's mother:  Leslie Avalos AKIN \"Gissell\" [[id number]]     Medications Prior to Admission   Medication Sig Dispense Refill   • Prenatal Vit-Fe Fumarate-FA (PRENATAL VITAMIN PO)      • Prenatal Vit-DSS-Fe Fum-FA (Prenatal 19) 29-1 MG tablet Take 1 tablet  by mouth daily. 90 tablet 0   • Misc. Devices (Breast Pump) Misc Use as needed for breastfeeding mom. 1 each 0   • amphetamine-dextroamphetamine (Adderall) 10 MG tablet Take 1 tablet by mouth 2 times daily. 56 tablet 0   • clindamycin (CLEOCIN T) 1 % lotion Apply BID to legs for two weeks. 60 mL 11   • clobetasol (TEMOVATE) 0.05 % ointment Apply twice a day to leg for up to 2 weeks, hold for 2 weeks. Repeat as needed. NOT FOR FACE, ARMPIT OR GROIN 30 g 2   • fluticasone (FLONASE) 50 MCG/ACT nasal spray Spray 1 spray in each nostril daily. 16 g 2      Pregnancy Complications:  Maternal GBBS Colonization  Prenatal Labs:  Information for the patient's mother:  Leslie Avalos \"Gissell\" [[id number]]     Recent Labs   Lab 11/29/21  0925 08/11/21  1342 04/23/21  1129 04/23/21  1129 04/23/21  1105   HIV Antigen/ Antibody Combo Screen  --   --   --  Nonreactive  --    Hepatitis C Antibody  --   --   --  Negative  --    Treponema Pallidum Antibody, IgG and IgM  --   --   --  Nonreactive  --    Neisseria gonorrhoeae by Nucleic Acid Amplification  --   --   --   --  Negative   Chlamydia trachomatis by Nucleic Acid Amplification  --   --   --   --  Negative   Rubella Antibody, IgG  --   --   --  348.1  --    ABORHDABR A Rh Positive  --    < > A Rh Positive  --    AS Negative  --    < > Negative  --    Varicella Zoster Antibody, IgG  --  Immune  --   --   --     < > = values in this interval not displayed.      Infant: No results found  Maternal HepB status: Resulted - Negative/Non-Reactive  Maternal Syphilis status: TPA-IgG/IgM negative  Maternal GBBS status: Resulted - Positive  Maternal HIV status: Resulted - Negative/Non-Reactive  Maternal Rubella status: Resulted - Immune  Maternal status (other): Hepatitis C Resulted - Negative/Non-Reactive                Maternal Inpatient Meds:  Information for the patient's mother:  Leslie Avalos \"Gissell\" [[id number]]     Current Facility-Administered Medications   Medication   • CEFAZOLIN  2000 MG/15ML SWFI IV SYRINGE ([facility] PREMIX) Pyxis Override   • naLOXone (NARCAN) injection 0.1 mg   • oxytocin (PITOCIN) 30 Units in sodium chloride 0.9% 500 mL   • PRENATAL vitamin & mineral w/ folic acid 1 mg tablet 1 tablet   • simethicone (MYLICON) tablet 125 mg   • calcium carbonate (TUMS) chewable tablet 500 mg   • ondansetron (ZOFRAN) injection 4 mg   • prochlorperazine (COMPAZINE) tablet 5 mg   • prochlorperazine (COMPAZINE) injection 5 mg   • docusate sodium-sennosides (SENOKOT S) 50-8.6 MG 2 tablet   • lactated ringers infusion   • ketorolac injection 15 mg    Followed by   • [START ON 2021] ibuprofen (MOTRIN) tablet 600 mg   • acetaminophen (TYLENOL) tablet 650 mg   • nalbuphine (NUBAIN) injection 5 mg   • ceFAZolin (ANCEF) syringe 2,000 mg   • [START ON 2021] HYDROcodone-acetaminophen (NORCO) 5-325 MG per tablet 2 tablet   • penicillin G potassium (PFIZERPEN) in sodium chloride 0.9% 100 mL IVPB 2.5 Million Units      Inpatient Baby Meds:   dextrose, hepatitis B IMMUNE GLOBULIN  Labor  Delivery Method:  , Low Transverse [251]  Rupture Date:  2021  Rupture Time: 3:40 PM  YOB: 2022  Time of Birth:  4:15 AM     BIRTH HISTORY:     Delivery Method: , Low Transverse [251]   Rupture date & time: 2021 3:40 PM   Date & time of birth 2022 4:15 AM   Induction: Misoprostol Post-term Gestation   Labor complications: Failure to Progress in First Stage;Fetal Intolerance     Placenta appearance: Intact   Cord info/complications: 3 Vessels None       Delayed cord clamping: No   Indications for : Fetal Intolerance of Labor;Cephalopelvic Disproportion   Presentation/position:   Left Occiput Transverse   Forceps attempted? No     Vacuum attempted? No     Shoulder dystocia? No                         Resuscitation:    Baby required Bulb suction, Drying and Stimulation,   None    Narrative Summary: baby placed on warmer, bulb suctioned and dried            APGARS  One minute Five minutes   Skin color: 0  1    Heart rate: 2  2     Reflex: 2  2    Muscle tone: 2  2    Breathin  2    Totals: 8 9      Birth Measurements:  Weight:  8 lb 14.2 oz (4030 g)    Length:  21.75\"    Head circumference:  36.8 cm     Health Care Maintenance:  Girl's Birth Weight:  8 lb 14.2 oz (4030 g)   Wt Readings from Last 4 Encounters:   21 4.03 kg (8 lb 14.2 oz) (95 %, Z= 1.62)*     * Growth percentiles are based on WHO (Girls, 0-2 years) data.     Change from birth weight:  0%        No results found  Recent Labs   Lab 21   GLUCOSE BEDSIDE 65       Hematologic/Blood Type:   Infant: No results found    Transcutaneous Bilirubin  TCB Result:    TCB Site:       Labs (Last 24 hours)  Recent Results (from the past 24 hour(s))   CORD BLOOD EVAL SAVE    Collection Time: 21  4:26 AM   Result Value Ref Range    Extra Tube Hold for Add Ons    GLUCOSE, BEDSIDE - POINT OF CARE    Collection Time: 21  6:28 AM   Result Value Ref Range    GLUCOSE, BEDSIDE - POINT OF CARE 65 36 - 89 mg/dL     Vital 24 Hour Range Last Value   Temperature Temp  Min: 98 °F (36.7 °C)  Max: 98.6 °F (37 °C) 98.3 °F (36.8 °C) (21)   Pulse Pulse  Min: 130  Max: 166 130 (21)   Respiratory Resp  Min: 36  Max: 56 36 (21)   Non-Invasive  Blood Pressure No data recorded     Arterial  Blood Pressure No data recorded     Pulse Oximetry No data recorded       PHYSICAL EXAM    GENERAL:  Alert, vigorous female with appropriate behavior.  She is in no acute distress.  SKIN:  Her skin is warm with normal turgor.  The color of the skin is pink.  There is no rash.  There are no bruises or other signs of injury.  No significant jaundice.  HEAD:  The head is atraumatic and normocephalic.  The anterior fontanel is open and flat.  EYES:  Opens both eyes equally.  Red reflexes Red Reflex Present Bilaterally -   EARS:  Pinnae and external ear canals normal.  NOSE:  There is no nasal  flaring, nares patent bilaterally.  THROAT:  The oropharynx is normal.  There is no cleft of the palate.  NECK:  Clavicles without crepitus.  TRUNK AND THORAX:  There are no lesions on the trunk; there is no dimple over the presacral area.  There are no retractions.  LUNGS:  The lung fields are clear to auscultation.  HEART:  The precordium is quiet.  The heart rhythm is grossly regular.  There are no murmurs.  The femoral pulses are normal.  ABDOMEN:  The umbilical cord stump is normal.  Three-vessel cord.  There is not an umbilical hernia.  The abdomen is flat and soft.   GENITALIA:  normal female genitalia.    RECTAL:  Anus patent.  EXTREMITIES:  Moving all 4 extremities.  The hip exam is normal.  There are no hip clicks. Normal Ortalani's and Mejia's.  NEUROLOGIC:  she displays normal tone throughout.  She is not jittery and she has normal  reflexes.  Clark reflex present. No focal deficits.    Early onset sepsis screen:     Sepsis Risk Calculator Data      Admission (Current) from 2021 in [facility] 3rd floor Well Baby Nursery   Gestational age (weeks) 41 weeks   Gestational age (days) 3 days   Highest maternal antepartum temp (F) 99.2   ROM (hours) 12.6 hours   Maternal GBS status 1   Type of intrapartum antibiotics 1          Risk at Birth: 0.21  Risk - Well Appearin.09  Risk - Equivocal: 1.05  Risk - Clinical Illness: 4.43    Clinical Exam:  Well Appearing (no persistent physiologic abnormalities)    Plan for EOS  - EOS Risk at Birth: Less than 1 per 1,000 live births and well appearing - No culture, No antibiotics, Routine Vitals  - Blood culture and CBC on admission - No  - No Antibiotics was initiated due to low risk of Sepsis - But will initiate later, if clinically indicated.      Feeding: Natural Human Milk  Stool Occurrence: 1 (21 05)         Patient Active Problem List   Diagnosis   • Term  delivered by  section, current hospitalization   • Welcome affected by  (positive) maternal group b Streptococcus (GBS) colonization    Term  delivered by  section, current hospitalization   done for failure to progress and fetal distress  A/P-Assessment: Term Normal   Plan:  Routine [place] Care  1. Anticipatory guidance provided for parents at bedside.  2. Breastfeed ad myra. Lactation consultation as needed.  3. Hepatitis B vaccine ordered  4. Hearing vaccine prior to discharge.  5. [place] Screen to be done at 24 hours  6. Bilirubin at 24 hours of life  7. Cardiac Screen prior to discharge  8. Discussed with mother regarding making appt with pediatrician 1-4 days after discharge.  9. Spoken to RN and reviewed findings and plan of care      [place] affected by (positive) maternal group b Streptococcus (GBS) colonization  Mom GBS positive, go PCN X 5  P: follow GBS protocol      ASSESSMENT:   Well 0 day old female infant. Feeding well, voiding and passing stool    PLAN:    Room-in with mother  GBS guidelines, Hypoglycemia guidelines and Normal  Care with Pediatrician    Discharge Planning:    Hearing screen :        State Screen drawn:      CHD Screening    Screening complete:     Right hand reading %:     Foot reading %:     CHD:      Immunizations  Most Recent Immunizations   Administered Date(s) Administered   • Hep B, adolescent or pediatric 2021   Pended Date(s) Pended   • Hepatitis B Immune Globulin 2021     Circumcision    Not indicated-Girl  Car Seat Screen    Not Indicated                Transcutaneous Bilirubin               TCB Result:                 TCB Site:                              Discharge Disposition:   home with mom in 2-3 days      Pediatrician after discharge: Ruben White MD  2021 8:54 AM     150

## 2021-01-21 PROBLEM — E78.5 HYPERLIPIDEMIA, UNSPECIFIED: Chronic | Status: ACTIVE | Noted: 2018-01-16

## 2021-01-21 PROBLEM — M54.5 LOW BACK PAIN: Chronic | Status: ACTIVE | Noted: 2018-01-16

## 2021-01-21 PROBLEM — I10 ESSENTIAL (PRIMARY) HYPERTENSION: Chronic | Status: ACTIVE | Noted: 2018-01-16

## 2021-01-21 PROBLEM — M16.11 UNILATERAL PRIMARY OSTEOARTHRITIS, RIGHT HIP: Chronic | Status: ACTIVE | Noted: 2018-01-16

## 2021-02-05 ENCOUNTER — APPOINTMENT (OUTPATIENT)
Dept: UROLOGY | Facility: CLINIC | Age: 61
End: 2021-02-05
Payer: COMMERCIAL

## 2021-02-05 VITALS
WEIGHT: 315 LBS | HEART RATE: 97 BPM | BODY MASS INDEX: 41.75 KG/M2 | HEIGHT: 73 IN | TEMPERATURE: 97.2 F | RESPIRATION RATE: 16 BRPM | DIASTOLIC BLOOD PRESSURE: 79 MMHG | SYSTOLIC BLOOD PRESSURE: 137 MMHG

## 2021-02-05 DIAGNOSIS — Z86.79 PERSONAL HISTORY OF OTHER DISEASES OF THE CIRCULATORY SYSTEM: ICD-10-CM

## 2021-02-05 DIAGNOSIS — Z80.42 FAMILY HISTORY OF MALIGNANT NEOPLASM OF PROSTATE: ICD-10-CM

## 2021-02-05 DIAGNOSIS — Z87.898 PERSONAL HISTORY OF OTHER SPECIFIED CONDITIONS: ICD-10-CM

## 2021-02-05 DIAGNOSIS — N39.0 URINARY TRACT INFECTION, SITE NOT SPECIFIED: ICD-10-CM

## 2021-02-05 DIAGNOSIS — Z63.5 DISRUPTION OF FAMILY BY SEPARATION AND DIVORCE: ICD-10-CM

## 2021-02-05 PROCEDURE — 99072 ADDL SUPL MATRL&STAF TM PHE: CPT

## 2021-02-05 PROCEDURE — 99204 OFFICE O/P NEW MOD 45 MIN: CPT

## 2021-02-05 SDOH — SOCIAL STABILITY - SOCIAL INSECURITY: DISRUPTION OF FAMILY BY SEPARATION AND DIVORCE: Z63.5

## 2021-02-05 NOTE — ASSESSMENT
[FreeTextEntry1] : Patient is a 60 yo M who presents with recurrent urethral stricture, LUTS, ED, and UTIs\par \par I had a discussion with the pt about his condition and possible treatment options for his urethral stricture.  We discussed repeat endoscopic management such as internal urethrotomy, dilation with/without intermittent self dilation, and urethroplasty.  Discussed the typical reported success rates with each treatment option.  Discussed given number of endoscopic procedures would recommend urethroplasty as a more definitive option.  Discussed that urethroplasty has the longest durability and highest success rates of 80-90%.  Discussed that pending work up and stricture characteristics would perform either an anastomotic urethroplasty or if the stricture was found to be more extensive would perform a buccal mucosa substitution grafting procedure.  Discussed risks/benefits/alternatives of surgery.  Risks including but not limited to bleeding, infection, penile curvature, erectile dysfunction, recurrence of stricture, poor wound healing discussed.  Discussed typical postoperative course and need for catheter for 2-3 weeks. \par \par Discussed with pt that need period of urethral rest prior to surgery of at least 3 months.  D/w pt that if he is unable to void he would need a SPT placed, not a urethral sanchez.  D/w pt that therefore surgery would not be performed prior to April given his procedure in Jan.\par \par Discussed with pt that givne his morbid obesity and diabetes he is at higher risk of periop complications and poor wound healing.  Encouraged wt loss and good diabetic control.\par \par Pt wishes to proceed with surgery, first will evaluate with cysto, RUG.

## 2021-02-05 NOTE — HISTORY OF PRESENT ILLNESS
[Urinary Urgency] : urinary urgency [Urinary Frequency] : urinary frequency [Nocturia] : nocturia [Straining] : straining [Weak Stream] : weak stream [Intermittency] : intermittency [Dysuria] : dysuria [FreeTextEntry1] : Patient is a 60 yo M who presents with urethral stricture.  Reports longstanding difficulty urinating since he was a teen.  He notes he had very weak stream and did undergo cystoscopy at that time and was told he had a stricture.  He then had a ?DVIU procedure at age 34.  He did ok for a short time and then stricture recurred.  He since then has had at least 2-3 additional endoscopic procedures.  Most recently 1 month ago he had a DVIU with steroid injection with Dr Beal after an episode of urinary retention and gross hematuria.  His Cr tejinder to 4 at that time before normalizing after his surgery.  However even post this procedure or even prior procedures, he has never had a strong stream or "normal urination".  He denies dysuria or pain with urination.  Since catheter was removed, he reports significant spraying with urination currently.\par \par AUASS 22, severe bother.\par \par No h/o STI/UTI, or trauma as child/teen prior to his urethral diagnosis.  He does recall his parents told him he as a baby his pediatrician put a pin into his penis.\par \par Of note he has been dealing with recurrent UTIs, he has h/o ~20 UTIs in his life.  Most recently just prior to AUR episode.\par \par He reports severe ED, for past 5 years he has had significant difficulty obtaining and maintaining an erection.  He has not had sex for past 3 years and not even able to masturbate for past 1.5 yrs.  Has not taken any meds for this.\par \par No h/o stones. [Hematuria - Gross] : no gross hematuria

## 2021-02-05 NOTE — REVIEW OF SYSTEMS
[Negative] : Heme/Lymph [Constipation] : constipation [Diarrhea] : diarrhea [Heartburn] : heartburn [Seen by urologist before (Name)  ___] : Preciously seen by a urologist: [unfilled] [Urine Infection (bladder/kidney)] : bladder/kidney infection [Told you have blood in urine on a urine test] : told blood was present in a urine test [Wake up at night to urinate  How many times?  ___] : wakes up to urinate [unfilled] times during the night [Interrupted urine stream] : interrupted urine stream [Bladder fullness after urinating] : bladder fullness after urinating [FreeTextEntry2] : high blood pressure

## 2021-02-05 NOTE — PHYSICAL EXAM
[General Appearance - Well Developed] : well developed [General Appearance - Well Nourished] : well nourished [Normal Appearance] : normal appearance [Well Groomed] : well groomed [General Appearance - In No Acute Distress] : no acute distress [Edema] : no peripheral edema [Respiration, Rhythm And Depth] : normal respiratory rhythm and effort [Exaggerated Use Of Accessory Muscles For Inspiration] : no accessory muscle use [Abdomen Soft] : soft [Abdomen Tenderness] : non-tender [Costovertebral Angle Tenderness] : no ~M costovertebral angle tenderness [FreeTextEntry1] : morbid obesity [Urethral Meatus] : meatus normal [Urinary Bladder Findings] : the bladder was normal on palpation [Scrotum] : the scrotum was normal [Testes Tenderness] : no tenderness of the testes [Testes Mass (___cm)] : there were no testicular masses [Anus Abnormality] : the anus and perineum were normal [Normal Station and Gait] : the gait and station were normal for the patient's age [] : no rash [No Focal Deficits] : no focal deficits [Oriented To Time, Place, And Person] : oriented to person, place, and time [Affect] : the affect was normal [Mood] : the mood was normal [Not Anxious] : not anxious

## 2021-02-08 ENCOUNTER — APPOINTMENT (OUTPATIENT)
Age: 61
End: 2021-02-08

## 2021-02-08 LAB
APPEARANCE: CLEAR
BACTERIA UR CULT: NORMAL
BILIRUBIN URINE: NEGATIVE
BLOOD URINE: NEGATIVE
COLOR: NORMAL
GLUCOSE QUALITATIVE U: NEGATIVE
KETONES URINE: NEGATIVE
LEUKOCYTE ESTERASE URINE: NEGATIVE
NITRITE URINE: NEGATIVE
PH URINE: 7
PROTEIN URINE: NEGATIVE
SPECIFIC GRAVITY URINE: 1.02
UROBILINOGEN URINE: NORMAL

## 2021-03-05 ENCOUNTER — APPOINTMENT (OUTPATIENT)
Dept: UROLOGY | Facility: CLINIC | Age: 61
End: 2021-03-05
Payer: COMMERCIAL

## 2021-03-05 ENCOUNTER — OUTPATIENT (OUTPATIENT)
Dept: OUTPATIENT SERVICES | Facility: HOSPITAL | Age: 61
LOS: 1 days | End: 2021-03-05
Payer: COMMERCIAL

## 2021-03-05 VITALS — OXYGEN SATURATION: 97 % | HEART RATE: 92 BPM | DIASTOLIC BLOOD PRESSURE: 82 MMHG | SYSTOLIC BLOOD PRESSURE: 144 MMHG

## 2021-03-05 DIAGNOSIS — R35.0 FREQUENCY OF MICTURITION: ICD-10-CM

## 2021-03-05 DIAGNOSIS — Z90.89 ACQUIRED ABSENCE OF OTHER ORGANS: Chronic | ICD-10-CM

## 2021-03-05 DIAGNOSIS — Z98.890 OTHER SPECIFIED POSTPROCEDURAL STATES: Chronic | ICD-10-CM

## 2021-03-05 PROCEDURE — 51610 INJECTION FOR BLADDER X-RAY: CPT

## 2021-03-05 PROCEDURE — 52000 CYSTOURETHROSCOPY: CPT

## 2021-03-05 PROCEDURE — 74450 X-RAY URETHRA/BLADDER: CPT

## 2021-03-05 PROCEDURE — 74450 X-RAY URETHRA/BLADDER: CPT | Mod: 26

## 2021-03-08 DIAGNOSIS — N35.919 UNSPECIFIED URETHRAL STRICTURE, MALE, UNSPECIFIED SITE: ICD-10-CM

## 2021-03-31 ENCOUNTER — OUTPATIENT (OUTPATIENT)
Dept: OUTPATIENT SERVICES | Facility: HOSPITAL | Age: 61
LOS: 1 days | End: 2021-03-31
Payer: COMMERCIAL

## 2021-03-31 VITALS
TEMPERATURE: 98 F | OXYGEN SATURATION: 96 % | SYSTOLIC BLOOD PRESSURE: 123 MMHG | WEIGHT: 315 LBS | HEIGHT: 73 IN | HEART RATE: 80 BPM | RESPIRATION RATE: 16 BRPM | DIASTOLIC BLOOD PRESSURE: 78 MMHG

## 2021-03-31 DIAGNOSIS — E11.9 TYPE 2 DIABETES MELLITUS WITHOUT COMPLICATIONS: ICD-10-CM

## 2021-03-31 DIAGNOSIS — Z98.890 OTHER SPECIFIED POSTPROCEDURAL STATES: Chronic | ICD-10-CM

## 2021-03-31 DIAGNOSIS — I10 ESSENTIAL (PRIMARY) HYPERTENSION: ICD-10-CM

## 2021-03-31 DIAGNOSIS — Z90.89 ACQUIRED ABSENCE OF OTHER ORGANS: Chronic | ICD-10-CM

## 2021-03-31 DIAGNOSIS — Z96.641 PRESENCE OF RIGHT ARTIFICIAL HIP JOINT: Chronic | ICD-10-CM

## 2021-03-31 DIAGNOSIS — E66.01 MORBID (SEVERE) OBESITY DUE TO EXCESS CALORIES: ICD-10-CM

## 2021-03-31 DIAGNOSIS — Z87.448 PERSONAL HISTORY OF OTHER DISEASES OF URINARY SYSTEM: ICD-10-CM

## 2021-03-31 DIAGNOSIS — N35.911 UNSPECIFIED URETHRAL STRICTURE, MALE, MEATAL: ICD-10-CM

## 2021-03-31 DIAGNOSIS — Z01.818 ENCOUNTER FOR OTHER PREPROCEDURAL EXAMINATION: ICD-10-CM

## 2021-03-31 DIAGNOSIS — G47.33 OBSTRUCTIVE SLEEP APNEA (ADULT) (PEDIATRIC): ICD-10-CM

## 2021-03-31 LAB
A1C WITH ESTIMATED AVERAGE GLUCOSE RESULT: 6.4 % — HIGH (ref 4–5.6)
ANION GAP SERPL CALC-SCNC: 17 MMOL/L — SIGNIFICANT CHANGE UP (ref 5–17)
BUN SERPL-MCNC: 18 MG/DL — SIGNIFICANT CHANGE UP (ref 7–23)
CALCIUM SERPL-MCNC: 9.9 MG/DL — SIGNIFICANT CHANGE UP (ref 8.4–10.5)
CHLORIDE SERPL-SCNC: 98 MMOL/L — SIGNIFICANT CHANGE UP (ref 96–108)
CO2 SERPL-SCNC: 23 MMOL/L — SIGNIFICANT CHANGE UP (ref 22–31)
CREAT SERPL-MCNC: 1.24 MG/DL — SIGNIFICANT CHANGE UP (ref 0.5–1.3)
ESTIMATED AVERAGE GLUCOSE: 137 MG/DL — HIGH (ref 68–114)
GLUCOSE SERPL-MCNC: 83 MG/DL — SIGNIFICANT CHANGE UP (ref 70–99)
HCT VFR BLD CALC: 41.9 % — SIGNIFICANT CHANGE UP (ref 39–50)
HGB BLD-MCNC: 13 G/DL — SIGNIFICANT CHANGE UP (ref 13–17)
MCHC RBC-ENTMCNC: 27 PG — SIGNIFICANT CHANGE UP (ref 27–34)
MCHC RBC-ENTMCNC: 31 GM/DL — LOW (ref 32–36)
MCV RBC AUTO: 87.1 FL — SIGNIFICANT CHANGE UP (ref 80–100)
NRBC # BLD: 0 /100 WBCS — SIGNIFICANT CHANGE UP (ref 0–0)
PLATELET # BLD AUTO: 334 K/UL — SIGNIFICANT CHANGE UP (ref 150–400)
POTASSIUM SERPL-MCNC: 4.1 MMOL/L — SIGNIFICANT CHANGE UP (ref 3.5–5.3)
POTASSIUM SERPL-SCNC: 4.1 MMOL/L — SIGNIFICANT CHANGE UP (ref 3.5–5.3)
RBC # BLD: 4.81 M/UL — SIGNIFICANT CHANGE UP (ref 4.2–5.8)
RBC # FLD: 15 % — HIGH (ref 10.3–14.5)
SODIUM SERPL-SCNC: 138 MMOL/L — SIGNIFICANT CHANGE UP (ref 135–145)
WBC # BLD: 8.76 K/UL — SIGNIFICANT CHANGE UP (ref 3.8–10.5)
WBC # FLD AUTO: 8.76 K/UL — SIGNIFICANT CHANGE UP (ref 3.8–10.5)

## 2021-03-31 PROCEDURE — 83036 HEMOGLOBIN GLYCOSYLATED A1C: CPT

## 2021-03-31 PROCEDURE — 85027 COMPLETE CBC AUTOMATED: CPT

## 2021-03-31 PROCEDURE — 87086 URINE CULTURE/COLONY COUNT: CPT

## 2021-03-31 PROCEDURE — 80048 BASIC METABOLIC PNL TOTAL CA: CPT

## 2021-03-31 PROCEDURE — G0463: CPT

## 2021-03-31 RX ORDER — CELECOXIB 200 MG/1
1 CAPSULE ORAL
Qty: 0 | Refills: 0 | DISCHARGE

## 2021-03-31 RX ORDER — CEFAZOLIN SODIUM 1 G
3000 VIAL (EA) INJECTION ONCE
Refills: 0 | Status: DISCONTINUED | OUTPATIENT
Start: 2021-04-14 | End: 2021-04-28

## 2021-03-31 RX ORDER — LIDOCAINE HCL 20 MG/ML
0.2 VIAL (ML) INJECTION ONCE
Refills: 0 | Status: DISCONTINUED | OUTPATIENT
Start: 2021-04-14 | End: 2021-04-28

## 2021-03-31 RX ORDER — SODIUM CHLORIDE 9 MG/ML
3 INJECTION INTRAMUSCULAR; INTRAVENOUS; SUBCUTANEOUS EVERY 8 HOURS
Refills: 0 | Status: DISCONTINUED | OUTPATIENT
Start: 2021-04-14 | End: 2021-04-28

## 2021-03-31 NOTE — H&P PST ADULT - MUSCULOSKELETAL COMMENTS
back pain for many yrs , see a chiropractor for 30 yrs , no surgery back pain for many yrs , see a chiropractor for 30 yrs.

## 2021-03-31 NOTE — H&P PST ADULT - GASTROINTESTINAL DETAILS
normal/soft/nontender/no distention/no masses palpable/bowel sounds normal/no rebound tenderness soft/nontender/no distention/no masses palpable/bowel sounds normal/no rebound tenderness

## 2021-03-31 NOTE — H&P PST ADULT - NSICDXPASTMEDICALHX_GEN_ALL_CORE_FT
PAST MEDICAL HISTORY:  Hyperlipidemia     Hypertension     Low back pain, unspecified back pain laterality, unspecified chronicity, with sciatica presence unspecified     Other sleep apnea     Type 2 diabetes mellitus     Unilateral primary osteoarthritis, right hip      PAST MEDICAL HISTORY:  Hyperlipidemia     Hypertension     Low back pain, unspecified back pain laterality, unspecified chronicity, with sciatica presence unspecified     EMILEE treated with BiPAP     Type 2 diabetes mellitus     Unilateral primary osteoarthritis, right hip

## 2021-03-31 NOTE — H&P PST ADULT - NEGATIVE ENMT SYMPTOMS
no hearing difficulty/no ear pain/no tinnitus/no vertigo/no sinus symptoms/no nasal congestion/no nasal discharge/no nasal obstruction/no post-nasal discharge

## 2021-03-31 NOTE — H&P PST ADULT - NEGATIVE GENERAL GENITOURINARY SYMPTOMS
no hematuria/no renal colic/no flank pain L/no flank pain R/no bladder infections/no incontinence/normal libido no bladder infections/no incontinence/normal libido

## 2021-03-31 NOTE — H&P PST ADULT - NEUROLOGICAL DETAILS
alert and oriented x 3/responds to verbal commands/normal strength alert and oriented x 3/responds to verbal commands/sensation intact/normal strength

## 2021-03-31 NOTE — H&P PST ADULT - NSICDXFAMILYHX_GEN_ALL_CORE_FT
FAMILY HISTORY:  Father  Still living? No  Family history of colon cancer, Age at diagnosis: Age Unknown  FH: prostate cancer, Age at diagnosis: Age Unknown    Mother  Still living? No  Family history of angina, Age at diagnosis: Age Unknown  Family history of dementia, Age at diagnosis: Age Unknown

## 2021-03-31 NOTE — H&P PST ADULT - NSICDXPROBLEM_GEN_ALL_CORE_FT
PROBLEM DIAGNOSES  Problem: H/O urethral stricture  Assessment and Plan: Cystoscopy, Urethroplasty    Problem: Hypertension  Assessment and Plan: Continue BP meds as directed, including am of surgery with sip of water    Problem: DM2 (diabetes mellitus, type 2)  Assessment and Plan: FS on arrival to [facility]    Problem: EMILEE treated with BiPAP  Assessment and Plan: Pt. instructed to bring BiPAP machine DOS  OR Booking notified    Problem: Morbid obesity  Assessment and Plan: Pt.'s BMI 52.9, Bariatric team notified

## 2021-03-31 NOTE — H&P PST ADULT - HISTORY OF PRESENT ILLNESS
This is a 58 year old male with PMH of DM, HTN, HLD who presents today for pre-surgical testing for right total hip replacement on 01/29/17 with Dr Hopper. Reports having right hip pain x 1-1.5 yrs ago, and limited ROM, had multiple injection to the site with temporary relief , had MRIs in Nov and was diagnosed with osteoarthritis of right hip, from where he was referred for hip replacement. 62 yo male, PMH HTN, HLD, DM2, morbid obesity, EMILEE, urethral stricture even before teenage years, s/p urethrotomy at age 34 with temporary relief, and has had an additional 3 endoscopic procedures, he states he has never had a strong stream or "normal urination, denies recent dysuria or gross hematuria. Pt. presents to [facility] for scheduled Cystoscopy, Urethroplasty on 4/14/21. Pt. denies COVID-19 infection in 2020/2021, denies close contact with anyone that has been ill, no fever, cough, dyspnea in past two weeks.    Pt. is scheduled for COVID-19 testing on 4/11 at [facility] 60 yo male, PMH HTN, HLD, DM2, morbid obesity, EMILEE, urethral stricture even before teenage years, s/p urethrotomy at age 34 with temporary relief, and has had an additional 3 endoscopic procedures, he states he has never had a strong stream or "normal urination, denies recent dysuria or gross hematuria. Pt. presents to [facility] for scheduled Cystoscopy, Urethroplasty on 4/14/21. Pt. denies COVID-19 infection in 2020/2021, denies close contact with anyone that has been ill, no fever, cough, dyspnea in past two weeks.    Pt. is scheduled for COVID-19 testing on 4/11 at [facility]    Cardiac note and EKG in chart  CBC, BMP, Urine culture done at PST

## 2021-03-31 NOTE — H&P PST ADULT - MUSCULOSKELETAL
right hip/normal/no joint swelling/no joint erythema/no joint warmth/no calf tenderness/decreased ROM due to pain detailed exam details… no calf tenderness/normal strength

## 2021-03-31 NOTE — H&P PST ADULT - NEGATIVE MUSCULOSKELETAL SYMPTOMS
no arthralgia/no joint swelling/no muscle cramps/no muscle weakness/no stiffness/no neck pain/no arm pain L/no arm pain R

## 2021-03-31 NOTE — H&P PST ADULT - NEGATIVE OPHTHALMOLOGIC SYMPTOMS
no diplopia/no photophobia/no lacrimation L/no lacrimation R/no blurred vision L/no blurred vision R/no discharge L/no discharge R/no pain L/no pain R/no irritation L

## 2021-03-31 NOTE — H&P PST ADULT - RS GEN PE MLT RESP DETAILS PC
normal/airway patent/breath sounds equal/good air movement/respirations non-labored/clear to auscultation bilaterally/no chest wall tenderness/no intercostal retractions/no rales/no rhonchi/no subcutaneous emphysema/no wheezes respirations non-labored/clear to auscultation bilaterally

## 2021-03-31 NOTE — H&P PST ADULT - NEGATIVE CARDIOVASCULAR SYMPTOMS
no chest pain/no palpitations/no dyspnea on exertion/no orthopnea no chest pain/no palpitations/no dyspnea on exertion/no orthopnea/no peripheral edema

## 2021-03-31 NOTE — H&P PST ADULT - NSICDXPASTSURGICALHX_GEN_ALL_CORE_FT
PAST SURGICAL HISTORY:  H/O cystoscopy several for urethral stricture X 3    History of tonsillectomy as a child    History of total right hip replacement 2018

## 2021-04-01 LAB
CULTURE RESULTS: SIGNIFICANT CHANGE UP
SPECIMEN SOURCE: SIGNIFICANT CHANGE UP

## 2021-04-03 ENCOUNTER — TRANSCRIPTION ENCOUNTER (OUTPATIENT)
Age: 61
End: 2021-04-03

## 2021-04-07 PROBLEM — G47.33 OBSTRUCTIVE SLEEP APNEA (ADULT) (PEDIATRIC): Chronic | Status: ACTIVE | Noted: 2021-03-31

## 2021-04-11 ENCOUNTER — APPOINTMENT (OUTPATIENT)
Dept: DISASTER EMERGENCY | Facility: CLINIC | Age: 61
End: 2021-04-11

## 2021-04-11 DIAGNOSIS — Z01.818 ENCOUNTER FOR OTHER PREPROCEDURAL EXAMINATION: ICD-10-CM

## 2021-04-12 LAB — SARS-COV-2 N GENE NPH QL NAA+PROBE: NOT DETECTED

## 2021-04-13 ENCOUNTER — TRANSCRIPTION ENCOUNTER (OUTPATIENT)
Age: 61
End: 2021-04-13

## 2021-04-14 ENCOUNTER — APPOINTMENT (OUTPATIENT)
Dept: UROLOGY | Facility: HOSPITAL | Age: 61
End: 2021-04-14

## 2021-04-14 ENCOUNTER — OUTPATIENT (OUTPATIENT)
Dept: OUTPATIENT SERVICES | Facility: HOSPITAL | Age: 61
LOS: 1 days | End: 2021-04-14
Payer: COMMERCIAL

## 2021-04-14 VITALS
DIASTOLIC BLOOD PRESSURE: 54 MMHG | OXYGEN SATURATION: 95 % | SYSTOLIC BLOOD PRESSURE: 107 MMHG | RESPIRATION RATE: 16 BRPM | HEART RATE: 97 BPM

## 2021-04-14 VITALS
HEIGHT: 73 IN | TEMPERATURE: 98 F | DIASTOLIC BLOOD PRESSURE: 77 MMHG | SYSTOLIC BLOOD PRESSURE: 131 MMHG | WEIGHT: 315 LBS | HEART RATE: 78 BPM | OXYGEN SATURATION: 98 % | RESPIRATION RATE: 20 BRPM

## 2021-04-14 DIAGNOSIS — Z96.641 PRESENCE OF RIGHT ARTIFICIAL HIP JOINT: Chronic | ICD-10-CM

## 2021-04-14 DIAGNOSIS — Z98.890 OTHER SPECIFIED POSTPROCEDURAL STATES: Chronic | ICD-10-CM

## 2021-04-14 DIAGNOSIS — Z90.89 ACQUIRED ABSENCE OF OTHER ORGANS: Chronic | ICD-10-CM

## 2021-04-14 DIAGNOSIS — N35.911 UNSPECIFIED URETHRAL STRICTURE, MALE, MEATAL: ICD-10-CM

## 2021-04-14 DIAGNOSIS — Z01.818 ENCOUNTER FOR OTHER PREPROCEDURAL EXAMINATION: ICD-10-CM

## 2021-04-14 LAB
GLUCOSE BLDC GLUCOMTR-MCNC: 171 MG/DL — HIGH (ref 70–99)
GLUCOSE BLDC GLUCOMTR-MCNC: 98 MG/DL — SIGNIFICANT CHANGE UP (ref 70–99)

## 2021-04-14 PROCEDURE — C1769: CPT

## 2021-04-14 PROCEDURE — C9399: CPT

## 2021-04-14 PROCEDURE — 53410 RECONSTRUCTION OF URETHRA: CPT

## 2021-04-14 PROCEDURE — 53410 RECONSTRUCTION OF URETHRA: CPT | Mod: 22

## 2021-04-14 PROCEDURE — 82962 GLUCOSE BLOOD TEST: CPT

## 2021-04-14 PROCEDURE — C1758: CPT

## 2021-04-14 RX ORDER — MAGNESIUM GLYCINATE 100 MG
2 CAPSULE ORAL
Qty: 0 | Refills: 0 | DISCHARGE

## 2021-04-14 RX ORDER — HYDROMORPHONE HYDROCHLORIDE 2 MG/ML
0.5 INJECTION INTRAMUSCULAR; INTRAVENOUS; SUBCUTANEOUS
Refills: 0 | Status: DISCONTINUED | OUTPATIENT
Start: 2021-04-14 | End: 2021-04-14

## 2021-04-14 RX ORDER — ASPIRIN/CALCIUM CARB/MAGNESIUM 324 MG
1 TABLET ORAL
Qty: 0 | Refills: 0 | DISCHARGE

## 2021-04-14 RX ORDER — IBUPROFEN 200 MG
1 TABLET ORAL
Qty: 20 | Refills: 0
Start: 2021-04-14 | End: 2021-04-18

## 2021-04-14 RX ORDER — UBIDECARENONE 100 MG
100 CAPSULE ORAL
Qty: 0 | Refills: 0 | DISCHARGE

## 2021-04-14 RX ORDER — METOPROLOL TARTRATE 50 MG
1 TABLET ORAL
Qty: 0 | Refills: 0 | DISCHARGE

## 2021-04-14 RX ORDER — CEPHALEXIN 500 MG
1 CAPSULE ORAL
Qty: 9 | Refills: 0
Start: 2021-04-14 | End: 2021-04-16

## 2021-04-14 RX ORDER — ONDANSETRON 8 MG/1
4 TABLET, FILM COATED ORAL ONCE
Refills: 0 | Status: DISCONTINUED | OUTPATIENT
Start: 2021-04-14 | End: 2021-04-14

## 2021-04-14 RX ORDER — SODIUM CHLORIDE 9 MG/ML
1000 INJECTION, SOLUTION INTRAVENOUS
Refills: 0 | Status: DISCONTINUED | OUTPATIENT
Start: 2021-04-14 | End: 2021-04-28

## 2021-04-14 RX ORDER — CEPHALEXIN 500 MG
1 CAPSULE ORAL
Qty: 15 | Refills: 0
Start: 2021-04-14 | End: 2021-04-18

## 2021-04-14 RX ORDER — CHOLECALCIFEROL (VITAMIN D3) 125 MCG
1 CAPSULE ORAL
Qty: 0 | Refills: 0 | DISCHARGE

## 2021-04-14 RX ADMIN — SODIUM CHLORIDE 3 MILLILITER(S): 9 INJECTION INTRAMUSCULAR; INTRAVENOUS; SUBCUTANEOUS at 06:45

## 2021-04-14 NOTE — ASU DISCHARGE PLAN (ADULT/PEDIATRIC) - ASU DC SPECIAL INSTRUCTIONSFT
Take Keflex 500mg three times a day for 3 day starting tonight.  Take Keflex 500mg three times a day starting 5 days before your follow up appointment for sanchez catheter removal.  Call the office if you have fever greater than 101, no drainage from the catheter, pain not relieved with pain medication, nausea/vomiting.  Follow up in the office in two weeks for sanchez catheter removal.  Do not remove sanchez beforehand!

## 2021-04-14 NOTE — ASU DISCHARGE PLAN (ADULT/PEDIATRIC) - CARE PROVIDER_API CALL
Estiven Sharma)  Urology  [address] [place] [address], [place] [address]  Phone: [phone]  Fax: [phone]  Follow Up Time:

## 2021-04-15 RX ORDER — CEPHALEXIN 500 MG/1
500 CAPSULE ORAL
Qty: 9 | Refills: 0 | Status: ACTIVE | COMMUNITY
Start: 2021-04-15 | End: 1900-01-01

## 2021-04-30 ENCOUNTER — APPOINTMENT (OUTPATIENT)
Dept: UROLOGY | Facility: CLINIC | Age: 61
End: 2021-04-30
Payer: COMMERCIAL

## 2021-04-30 ENCOUNTER — OUTPATIENT (OUTPATIENT)
Dept: OUTPATIENT SERVICES | Facility: HOSPITAL | Age: 61
LOS: 1 days | End: 2021-04-30
Payer: COMMERCIAL

## 2021-04-30 DIAGNOSIS — N35.919 UNSPECIFIED URETHRAL STRICTURE, MALE, UNSPECIFIED SITE: ICD-10-CM

## 2021-04-30 DIAGNOSIS — Z90.89 ACQUIRED ABSENCE OF OTHER ORGANS: Chronic | ICD-10-CM

## 2021-04-30 DIAGNOSIS — R35.0 FREQUENCY OF MICTURITION: ICD-10-CM

## 2021-04-30 DIAGNOSIS — Z96.641 PRESENCE OF RIGHT ARTIFICIAL HIP JOINT: Chronic | ICD-10-CM

## 2021-04-30 DIAGNOSIS — Z98.890 OTHER SPECIFIED POSTPROCEDURAL STATES: Chronic | ICD-10-CM

## 2021-04-30 PROCEDURE — 74450 X-RAY URETHRA/BLADDER: CPT | Mod: 26

## 2021-04-30 PROCEDURE — 51610 INJECTION FOR BLADDER X-RAY: CPT | Mod: 58

## 2021-04-30 PROCEDURE — 51610 INJECTION FOR BLADDER X-RAY: CPT

## 2021-04-30 PROCEDURE — 74450 X-RAY URETHRA/BLADDER: CPT

## 2021-05-04 ENCOUNTER — NON-APPOINTMENT (OUTPATIENT)
Age: 61
End: 2021-05-04

## 2021-05-04 RX ORDER — CIPROFLOXACIN HYDROCHLORIDE 500 MG/1
500 TABLET, FILM COATED ORAL TWICE DAILY
Qty: 14 | Refills: 0 | Status: ACTIVE | COMMUNITY
Start: 2021-05-04 | End: 1900-01-01

## 2021-06-01 ENCOUNTER — APPOINTMENT (OUTPATIENT)
Dept: UROLOGY | Facility: CLINIC | Age: 61
End: 2021-06-01

## 2021-06-01 ENCOUNTER — TRANSCRIPTION ENCOUNTER (OUTPATIENT)
Age: 61
End: 2021-06-01

## 2021-07-23 ENCOUNTER — APPOINTMENT (OUTPATIENT)
Dept: UROLOGY | Facility: CLINIC | Age: 61
End: 2021-07-23

## 2021-08-27 ENCOUNTER — APPOINTMENT (OUTPATIENT)
Dept: UROLOGY | Facility: CLINIC | Age: 61
End: 2021-08-27

## 2021-09-07 ENCOUNTER — APPOINTMENT (OUTPATIENT)
Dept: UROLOGY | Facility: CLINIC | Age: 61
End: 2021-09-07
Payer: COMMERCIAL

## 2021-09-07 ENCOUNTER — APPOINTMENT (OUTPATIENT)
Age: 61
End: 2021-09-07

## 2021-09-07 ENCOUNTER — OUTPATIENT (OUTPATIENT)
Dept: OUTPATIENT SERVICES | Facility: HOSPITAL | Age: 61
LOS: 1 days | End: 2021-09-07
Payer: COMMERCIAL

## 2021-09-07 DIAGNOSIS — Z96.641 PRESENCE OF RIGHT ARTIFICIAL HIP JOINT: Chronic | ICD-10-CM

## 2021-09-07 DIAGNOSIS — Z98.890 OTHER SPECIFIED POSTPROCEDURAL STATES: Chronic | ICD-10-CM

## 2021-09-07 DIAGNOSIS — R39.198 OTHER DIFFICULTIES WITH MICTURITION: ICD-10-CM

## 2021-09-07 DIAGNOSIS — Z90.89 ACQUIRED ABSENCE OF OTHER ORGANS: Chronic | ICD-10-CM

## 2021-09-07 LAB
BILIRUB UR QL STRIP: NEGATIVE
CLARITY UR: CLEAR
COLLECTION METHOD: NORMAL
GLUCOSE UR-MCNC: NEGATIVE
HCG UR QL: 0.2 EU/DL
HGB UR QL STRIP.AUTO: NEGATIVE
KETONES UR-MCNC: NEGATIVE
LEUKOCYTE ESTERASE UR QL STRIP: NEGATIVE
NITRITE UR QL STRIP: NEGATIVE
PH UR STRIP: 5.5
PROT UR STRIP-MCNC: NORMAL
SP GR UR STRIP: >=1.03

## 2021-09-07 PROCEDURE — 52000 CYSTOURETHROSCOPY: CPT

## 2021-09-07 PROCEDURE — 52281 CYSTOSCOPY AND TREATMENT: CPT

## 2021-09-07 NOTE — PHYSICAL EXAM
[General Appearance - Well Developed] : well developed [General Appearance - Well Nourished] : well nourished [Normal Appearance] : normal appearance [Well Groomed] : well groomed [General Appearance - In No Acute Distress] : no acute distress [Urinary Bladder Findings] : the bladder was normal on palpation [FreeTextEntry1] : obese, no SP pain or distention

## 2021-09-07 NOTE — ASSESSMENT
[FreeTextEntry1] : Patient is a 60 yo M who presents with urethral stricture.  \par S/p urethroplasty 4/30/21.\par \par Overall doing better but intermittent bothersome symptoms.\par PVR today 98cc\par Udip neg for infection.\par D/w pt performing cystoscopy to assess urethra\par Pt interested in performing today - will check for add on
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
## 2021-09-10 DIAGNOSIS — N35.919 UNSPECIFIED URETHRAL STRICTURE, MALE, UNSPECIFIED SITE: ICD-10-CM

## 2021-09-10 DIAGNOSIS — R39.198 OTHER DIFFICULTIES WITH MICTURITION: ICD-10-CM

## 2021-09-10 RX ORDER — SULFAMETHOXAZOLE AND TRIMETHOPRIM 800; 160 MG/1; MG/1
800-160 TABLET ORAL TWICE DAILY
Qty: 10 | Refills: 0 | Status: ACTIVE | COMMUNITY
Start: 2021-09-10 | End: 1900-01-01

## 2021-09-13 LAB
APPEARANCE: ABNORMAL
BACTERIA: ABNORMAL
BILIRUBIN URINE: NEGATIVE
BLOOD URINE: ABNORMAL
COLOR: YELLOW
GLUCOSE QUALITATIVE U: NEGATIVE
HYALINE CASTS: 0 /LPF
KETONES URINE: NEGATIVE
LEUKOCYTE ESTERASE URINE: ABNORMAL
MICROSCOPIC-UA: NORMAL
NITRITE URINE: POSITIVE
PH URINE: 6
PROTEIN URINE: ABNORMAL
RED BLOOD CELLS URINE: 3 /HPF
SPECIFIC GRAVITY URINE: 1.03
SQUAMOUS EPITHELIAL CELLS: 2 /HPF
URINE COMMENTS: NORMAL
UROBILINOGEN URINE: NORMAL
WHITE BLOOD CELLS URINE: >720 /HPF

## 2021-09-21 RX ORDER — CEPHALEXIN 500 MG/1
500 CAPSULE ORAL
Qty: 15 | Refills: 0 | Status: ACTIVE | COMMUNITY
Start: 2021-09-13 | End: 1900-01-01

## 2021-11-16 ENCOUNTER — RX CHANGE (OUTPATIENT)
Age: 61
End: 2021-11-16

## 2021-11-16 ENCOUNTER — OUTPATIENT (OUTPATIENT)
Dept: OUTPATIENT SERVICES | Facility: HOSPITAL | Age: 61
LOS: 1 days | End: 2021-11-16
Payer: COMMERCIAL

## 2021-11-16 ENCOUNTER — APPOINTMENT (OUTPATIENT)
Dept: UROLOGY | Facility: CLINIC | Age: 61
End: 2021-11-16
Payer: COMMERCIAL

## 2021-11-16 VITALS — DIASTOLIC BLOOD PRESSURE: 83 MMHG | SYSTOLIC BLOOD PRESSURE: 170 MMHG | HEART RATE: 102 BPM

## 2021-11-16 DIAGNOSIS — R35.0 FREQUENCY OF MICTURITION: ICD-10-CM

## 2021-11-16 DIAGNOSIS — N35.919 UNSPECIFIED URETHRAL STRICTURE, MALE, UNSPECIFIED SITE: ICD-10-CM

## 2021-11-16 DIAGNOSIS — N52.9 MALE ERECTILE DYSFUNCTION, UNSPECIFIED: ICD-10-CM

## 2021-11-16 DIAGNOSIS — Z90.89 ACQUIRED ABSENCE OF OTHER ORGANS: Chronic | ICD-10-CM

## 2021-11-16 DIAGNOSIS — Z98.890 OTHER SPECIFIED POSTPROCEDURAL STATES: Chronic | ICD-10-CM

## 2021-11-16 DIAGNOSIS — Z96.641 PRESENCE OF RIGHT ARTIFICIAL HIP JOINT: Chronic | ICD-10-CM

## 2021-11-16 LAB
BILIRUB UR QL STRIP: NEGATIVE
CLARITY UR: CLEAR
COLLECTION METHOD: NORMAL
GLUCOSE UR-MCNC: NEGATIVE
HCG UR QL: 0.2 EU/DL
HGB UR QL STRIP.AUTO: ABNORMAL
KETONES UR-MCNC: NEGATIVE
LEUKOCYTE ESTERASE UR QL STRIP: NEGATIVE
NITRITE UR QL STRIP: NEGATIVE
PH UR STRIP: 6
PROT UR STRIP-MCNC: NEGATIVE
SP GR UR STRIP: 1.01

## 2021-11-16 PROCEDURE — 52000 CYSTOURETHROSCOPY: CPT

## 2021-11-16 RX ORDER — TADALAFIL 5 MG/1
5 TABLET ORAL
Qty: 8 | Refills: 2 | Status: ACTIVE | COMMUNITY
Start: 2021-11-16 | End: 1900-01-01

## 2021-11-18 LAB — PSA SERPL-MCNC: 1 NG/ML

## 2022-01-12 ENCOUNTER — APPOINTMENT (OUTPATIENT)
Dept: UROLOGY | Facility: HOSPITAL | Age: 62
End: 2022-01-12

## 2022-04-26 ENCOUNTER — APPOINTMENT (OUTPATIENT)
Dept: UROLOGY | Facility: CLINIC | Age: 62
End: 2022-04-26

## 2022-08-24 NOTE — BRIEF OPERATIVE NOTE - NSICDXBRIEFPOSTOP_GEN_ALL_CORE_FT
57
POST-OP DIAGNOSIS:  Status post release of urethral stricture 14-Apr-2021 15:04:06  [place] Re[place]

## 2023-07-06 NOTE — ASU PREOP CHECKLIST - HAIR REMOVAL

## 2025-05-27 NOTE — PHYSICAL THERAPY INITIAL EVALUATION ADULT - GAIT TRAINING, PT EVAL
Pt expressed understanding of new patient appointment with Dr. BAI  
Pt will be supervision level of assistance for ambulation with appropriate assistive device for 100 feet in 3-5 sessions
